# Patient Record
Sex: MALE | Race: WHITE | NOT HISPANIC OR LATINO | URBAN - METROPOLITAN AREA
[De-identification: names, ages, dates, MRNs, and addresses within clinical notes are randomized per-mention and may not be internally consistent; named-entity substitution may affect disease eponyms.]

---

## 2017-04-06 ENCOUNTER — TELEPHONE (OUTPATIENT)
Dept: FAMILY MEDICINE | Facility: CLINIC | Age: 32
End: 2017-04-06

## 2017-04-06 ENCOUNTER — OFFICE VISIT (OUTPATIENT)
Dept: FAMILY MEDICINE | Facility: CLINIC | Age: 32
End: 2017-04-06
Payer: COMMERCIAL

## 2017-04-06 VITALS
SYSTOLIC BLOOD PRESSURE: 110 MMHG | TEMPERATURE: 97.8 F | HEIGHT: 66 IN | DIASTOLIC BLOOD PRESSURE: 70 MMHG | WEIGHT: 148.5 LBS | BODY MASS INDEX: 23.87 KG/M2 | OXYGEN SATURATION: 97 % | HEART RATE: 103 BPM

## 2017-04-06 DIAGNOSIS — R14.2 FLATULENCE, ERUCTATION, AND GAS PAIN: ICD-10-CM

## 2017-04-06 DIAGNOSIS — R14.3 FLATULENCE, ERUCTATION, AND GAS PAIN: ICD-10-CM

## 2017-04-06 DIAGNOSIS — Z87.890 TRANS-SEXUALISM, STATUS POST GENDER REASSIGNMENT SURGERY: ICD-10-CM

## 2017-04-06 DIAGNOSIS — R10.84 ABDOMINAL PAIN, GENERALIZED: Primary | ICD-10-CM

## 2017-04-06 DIAGNOSIS — R14.1 FLATULENCE, ERUCTATION, AND GAS PAIN: ICD-10-CM

## 2017-04-06 LAB
BASOPHILS # BLD AUTO: 0 10E9/L (ref 0–0.2)
BASOPHILS NFR BLD AUTO: 0.2 %
DIFFERENTIAL METHOD BLD: NORMAL
EOSINOPHIL # BLD AUTO: 0.1 10E9/L (ref 0–0.7)
EOSINOPHIL NFR BLD AUTO: 1.1 %
ERYTHROCYTE [DISTWIDTH] IN BLOOD BY AUTOMATED COUNT: 12.2 % (ref 10–15)
HCT VFR BLD AUTO: 46.2 % (ref 40–53)
HGB BLD-MCNC: 15.6 G/DL (ref 13.3–17.7)
LYMPHOCYTES # BLD AUTO: 2 10E9/L (ref 0.8–5.3)
LYMPHOCYTES NFR BLD AUTO: 24.2 %
MCH RBC QN AUTO: 30.6 PG (ref 26.5–33)
MCHC RBC AUTO-ENTMCNC: 33.8 G/DL (ref 31.5–36.5)
MCV RBC AUTO: 91 FL (ref 78–100)
MONOCYTES # BLD AUTO: 0.6 10E9/L (ref 0–1.3)
MONOCYTES NFR BLD AUTO: 7.6 %
NEUTROPHILS # BLD AUTO: 5.4 10E9/L (ref 1.6–8.3)
NEUTROPHILS NFR BLD AUTO: 66.9 %
PLATELET # BLD AUTO: 376 10E9/L (ref 150–450)
RBC # BLD AUTO: 5.1 10E12/L (ref 4.4–5.9)
WBC # BLD AUTO: 8.1 10E9/L (ref 4–11)

## 2017-04-06 PROCEDURE — 99214 OFFICE O/P EST MOD 30 MIN: CPT | Performed by: PHYSICIAN ASSISTANT

## 2017-04-06 PROCEDURE — 85025 COMPLETE CBC W/AUTO DIFF WBC: CPT | Performed by: PHYSICIAN ASSISTANT

## 2017-04-06 PROCEDURE — 36415 COLL VENOUS BLD VENIPUNCTURE: CPT | Performed by: PHYSICIAN ASSISTANT

## 2017-04-06 PROCEDURE — 80053 COMPREHEN METABOLIC PANEL: CPT | Performed by: PHYSICIAN ASSISTANT

## 2017-04-06 RX ORDER — DULOXETIN HYDROCHLORIDE 60 MG/1
60 CAPSULE, DELAYED RELEASE ORAL DAILY
COMMUNITY
Start: 2017-02-09 | End: 2018-06-08 | Stop reason: ALTCHOICE

## 2017-04-06 RX ORDER — TESTOSTERONE 12.5 MG/1.25G
100 GEL TOPICAL DAILY
Qty: 150 G | Refills: 5 | Status: SHIPPED | OUTPATIENT
Start: 2017-04-06 | End: 2017-10-16

## 2017-04-06 NOTE — MR AVS SNAPSHOT
"              After Visit Summary   4/6/2017    Kevin Lackaff Gilligan    MRN: [id number]           Patient Information     Date Of Birth          1985        Visit Information        Provider Department      4/6/2017 4:40 PM Davis Awad PA-C [facility]        Today's Diagnoses     Abdominal pain, generalized    -  1    Flatulence, eructation, and gas pain        Trans-sexualism, status post gender reassignment surgery           Follow-ups after your visit        Who to contact     If you have questions or need follow up information about today's clinic visit or your schedule please contact [facility] directly at [phone].  Normal or non-critical lab and imaging results will be communicated to you by Monkimunhart, letter or phone within 4 business days after the clinic has received the results. If you do not hear from us within 7 days, please contact the clinic through Monkimunhart or phone. If you have a critical or abnormal lab result, we will notify you by phone as soon as possible.  Submit refill requests through Inktank or call your pharmacy and they will forward the refill request to us. Please allow 3 business days for your refill to be completed.          Additional Information About Your Visit        MyChart Information     Inktank gives you secure access to your electronic health record. If you see a primary care provider, you can also send messages to your care team and make appointments. If you have questions, please call your primary care clinic.  If you do not have a primary care provider, please call [phone] and they will assist you.        Care EveryWhere ID     This is your Care EveryWhere ID. This could be used by other organizations to access your [facility] medical records  [id number]        Your Vitals Were     Pulse Temperature Height Pulse Oximetry BMI (Body Mass Index)       103 97.8  F (36.6  C) (Tympanic) 5' 6\" (1.676 m) 97% 23.97 kg/m2        Blood " Pressure from Last 3 Encounters:   04/06/17 110/70   07/19/16 127/78   09/28/15 133/90    Weight from Last 3 Encounters:   04/06/17 148 lb 8 oz (67.4 kg)   07/19/16 146 lb (66.2 kg)   09/28/15 152 lb (68.9 kg)              We Performed the Following     CBC with platelets differential     Comprehensive metabolic panel          Today's Medication Changes          These changes are accurate as of: 4/6/17  5:09 PM.  If you have any questions, ask your nurse or doctor.               These medicines have changed or have updated prescriptions.        Dose/Directions    * TESTIM 50 MG/5GM (1%) topical gel   This may have changed:  Another medication with the same name was added. Make sure you understand how and when to take each.   Used for:  Trans-sexualism, status post gender reassignment surgery   Generic drug:  testosterone   Changed by:  Apurva Ruano MD        Uses compounded 10% gel 100mg/GM 2 pumps applied per day ([id number]) Apply to clean, dry, intact skin of the shoulders, upper arms, or abdomen.   Quantity:  3 Month   Refills:  3       * testosterone 50 MG/5GM (1%) topical gel   Commonly known as:  ANDROGEL/TESTIM   This may have changed:  Another medication with the same name was added. Make sure you understand how and when to take each.   Used for:  Trans-sexualism, status post gender reassignment surgery   Changed by:  Apurva Ruano MD        Dose:  100 mg   Place 2 packets (100 mg) onto the skin daily   Quantity:  3 Month   Refills:  3       * testosterone 50 MG/5GM (1%) topical gel   Commonly known as:  ANDROGEL   This may have changed:  You were already taking a medication with the same name, and this prescription was added. Make sure you understand how and when to take each.   Used for:  Trans-sexualism, status post gender reassignment surgery   Changed by:  Davis Awad PA-C        Dose:  100 mg   Place 2 packets (100 mg) onto the skin daily Apply to the clean, dry intact  skin of the shoulders, upper arms or abdomen.   Quantity:  150 g   Refills:  5       * Notice:  This list has 3 medication(s) that are the same as other medications prescribed for you. Read the directions carefully, and ask your doctor or other care provider to review them with you.         Where to get your medicines      Some of these will need a paper prescription and others can be bought over the counter.  Ask your nurse if you have questions.     Bring a paper prescription for each of these medications     testosterone 50 MG/5GM (1%) topical gel                Primary Care Provider Office Phone # Fax #    Apurva Ruano [phone]       [facility] [address] [address]        Thank you!     Thank you for choosing [facility]  for your care. Our goal is always to provide you with excellent care. Hearing back from our patients is one way we can continue to improve our services. Please take a few minutes to complete the written survey that you may receive in the mail after your visit with us. Thank you!             Your Updated Medication List - Protect others around you: Learn how to safely use, store and throw away your medicines at www.disposemymeds.org.          This list is accurate as of: 4/6/17  5:09 PM.  Always use your most recent med list.                   Brand Name Dispense Instructions for use    DULoxetine 60 MG EC capsule    CYMBALTA     Take 60 mg by mouth daily       propranolol 40 MG tablet    INDERAL    180 tablet    Take 1 tablet (40 mg) by mouth 2 times daily Use as needed for presentations       * TESTIM 50 MG/5GM (1%) topical gel   Generic drug:  testosterone     3 Month    Uses compounded 10% gel 100mg/GM 2 pumps applied per day ([id number]) Apply to clean, dry, intact skin of the shoulders, upper arms, or abdomen.       * testosterone 50 MG/5GM (1%) topical gel    ANDROGEL/TESTIM    3 Month    Place 2 packets (100 mg)  onto the skin daily       * testosterone 50 MG/5GM (1%) topical gel    ANDROGEL    150 g    Place 2 packets (100 mg) onto the skin daily Apply to the clean, dry intact skin of the shoulders, upper arms or abdomen.       * Notice:  This list has 3 medication(s) that are the same as other medications prescribed for you. Read the directions carefully, and ask your doctor or other care provider to review them with you.

## 2017-04-06 NOTE — TELEPHONE ENCOUNTER
"Patient is on the schedule for 440pm today for severe stomach pain.  Was asked to triage pt.    Pt states he's had lower abd pain for the past couple days.  Pain was sharp and throbbing last night.  Then had episode of diarrhea and felt better  Pain currently 7/10 and throbs  No longer a sharp pain  Pain is to whole lower abd, not on one particular side  Afebrile. No blood in stool. Nauseous sometimes.    Advised ER if pain \"severe\" because better imaging options, etc.  Pt states he didn't even want to come to the clinic; his parents made him schedule  Advised he keep appt here then for assessment and JS will determine next steps  Jessica SMAANIEGO RN    "

## 2017-04-06 NOTE — PROGRESS NOTES
SUBJECTIVE:                                                    Kevin Lackaff Gilligan is a 31 year old male who presents to clinic today for the following health issues:      Abdominal Pain      Duration: This week  Worse last night     Description (location/character/radiation): LQ pain       Associated flank pain:  Lower back    Intensity:  6/10    Accompanying signs and symptoms:        Fever/Chills: no        Gas/Bloating: YES-  Gas        Nausea/vomitting: YES- nausea more so just last night       Diarrhea: YES-  Just last night       Dysuria or Hematuria: no     History (previous similar pain/trauma/previous testing):   Unsure     Precipitating or alleviating factors:       Pain worse with eating/BM/urination: no       Pain relieved by BM: YES-  Last night    Therapies tried and outcome:  none    LMP:  not applicable   All.DREW Solo          Problem list and histories reviewed & adjusted, as indicated.  Additional history: 32 y/o trans sexual male here for evaluation of the above symptoms for the last week.  Just started with some general lower abdomen pain for the last week, thought it might have been some gas.  It was quite a bit worse last night, and he did have some diarrhea, which did help.  During the day today, still some pain.  Not been passing more gas per se. Unsure if he has eaten anything.  Not real hungry no vomit.    No fevers or chills.  No recent changes in medication.  No blood in stool.        He also does need refills of his testosterone.  I has almost been 1 year since his levels were last checked.  Taking 100 mg of testosterone.  Not taking anything aromatase inhibitors.      BP Readings from Last 3 Encounters:   04/06/17 110/70   07/19/16 127/78   09/28/15 133/90    Wt Readings from Last 3 Encounters:   04/06/17 148 lb 8 oz (67.4 kg)   07/19/16 146 lb (66.2 kg)   09/28/15 152 lb (68.9 kg)                    Reviewed and updated as needed this visit by clinical staff       Reviewed and  "updated as needed this visit by Provider         ROS:  Constitutional, HEENT, cardiovascular, pulmonary, gi and gu systems are negative, except as otherwise noted.    OBJECTIVE:                                                    /70 (BP Location: Right arm, Patient Position: Right side, Cuff Size: Adult Regular)  Pulse 103  Temp 97.8  F (36.6  C) (Tympanic)  Ht 5' 6\" (1.676 m)  Wt 148 lb 8 oz (67.4 kg)  SpO2 97%  BMI 23.97 kg/m2  Body mass index is 23.97 kg/(m^2).  GENERAL: alert and no distress  EYES: Eyes grossly normal to inspection  RESP: lungs clear to auscultation - no rales, rhonchi or wheezes  CV: regular rate and rhythm, normal S1 S2, no S3 or S4, no murmur, click or rub, no peripheral edema and peripheral pulses strong  ABDOMEN: normal bowel sounds.  No masses.  Generalized lower abdomen tenderness without rebound or guarding.      Diagnostic Test Results:  none      ASSESSMENT/PLAN:                                                            1. Abdominal pain, generalized  Does seem most consistent with gastroenteritis with cramping and diarrhea.  Will check some labs, but will not have until tomorrow.  I think some OTC pepto may be worth a trial.  Hopefully this will just run its course.  If gets much worse tonight, do not hesitate to go to ER for quicker evaluation.  Does not appear to be acute abdomen.  - CBC with platelets differential  - Comprehensive metabolic panel    2. Flatulence, eructation, and gas pain      3. Trans-sexualism, status post gender reassignment surgery  Does need to get levels rechecked, and would encourage him to add estrogen level to make sure he is not aromatizing his external testosterone.  Would like him to get back to baseline before checking those labs.  - testosterone (ANDROGEL) 50 MG/5GM (1%) topical gel; Place 2 packets (100 mg) onto the skin daily Apply to the clean, dry intact skin of the shoulders, upper arms or abdomen.  Dispense: 150 g; Refill: " 5        Davis Awad PA-C  [facility]

## 2017-04-06 NOTE — NURSING NOTE
"Chief Complaint   Patient presents with     Abdominal Pain     /70 (BP Location: Right arm, Patient Position: Right side, Cuff Size: Adult Regular)  Pulse 103  Temp 97.8  F (36.6  C) (Tympanic)  Ht 5' 6\" (1.676 m)  Wt 148 lb 8 oz (67.4 kg)  SpO2 97%  BMI 23.97 kg/m2 Estimated body mass index is 23.97 kg/(m^2) as calculated from the following:    Height as of this encounter: 5' 6\" (1.676 m).    Weight as of this encounter: 148 lb 8 oz (67.4 kg).  bp completed using cuff size: regular      Health Maintenance addressed:  NONE    n/a              "

## 2017-04-07 LAB
ALBUMIN SERPL-MCNC: 4.2 G/DL (ref 3.4–5)
ALP SERPL-CCNC: 91 U/L (ref 40–150)
ALT SERPL W P-5'-P-CCNC: 27 U/L (ref 0–70)
ANION GAP SERPL CALCULATED.3IONS-SCNC: 7 MMOL/L (ref 3–14)
AST SERPL W P-5'-P-CCNC: 20 U/L (ref 0–45)
BILIRUB SERPL-MCNC: 0.8 MG/DL (ref 0.2–1.3)
BUN SERPL-MCNC: 7 MG/DL (ref 7–30)
CALCIUM SERPL-MCNC: 9.2 MG/DL (ref 8.5–10.1)
CHLORIDE SERPL-SCNC: 107 MMOL/L (ref 94–109)
CO2 SERPL-SCNC: 27 MMOL/L (ref 20–32)
CREAT SERPL-MCNC: 0.89 MG/DL (ref 0.66–1.25)
GFR SERPL CREATININE-BSD FRML MDRD: NORMAL ML/MIN/1.7M2
GLUCOSE SERPL-MCNC: 70 MG/DL (ref 70–99)
POTASSIUM SERPL-SCNC: 3.8 MMOL/L (ref 3.4–5.3)
PROT SERPL-MCNC: 8 G/DL (ref 6.8–8.8)
SODIUM SERPL-SCNC: 141 MMOL/L (ref 133–144)

## 2017-04-10 NOTE — PROGRESS NOTES
Dear Lele    Your test results are attached, feel free to contact me via Jumiot    Everything looks good on recent labs.  Let me know if symptoms persist or worsen.    Gene Awad PA-C

## 2017-04-12 ENCOUNTER — TELEPHONE (OUTPATIENT)
Dept: FAMILY MEDICINE | Facility: CLINIC | Age: 32
End: 2017-04-12

## 2017-04-12 NOTE — TELEPHONE ENCOUNTER
Initiated prior auth by making phone call to [employer] at phone number [phone] for Testosterone 1% gel.  ID# [id number]  PA approved over the phone for 12 months.  [facility] [address] ph# [phone]    Status:  Approved

## 2017-04-28 DIAGNOSIS — Z87.890 TRANS-SEXUALISM, STATUS POST GENDER REASSIGNMENT SURGERY: ICD-10-CM

## 2017-04-28 LAB
ALBUMIN SERPL-MCNC: 4 G/DL (ref 3.4–5)
ALP SERPL-CCNC: 104 U/L (ref 40–150)
ALT SERPL W P-5'-P-CCNC: 25 U/L (ref 0–70)
ANION GAP SERPL CALCULATED.3IONS-SCNC: 8 MMOL/L (ref 3–14)
AST SERPL W P-5'-P-CCNC: 18 U/L (ref 0–45)
BASOPHILS # BLD AUTO: 0 10E9/L (ref 0–0.2)
BASOPHILS NFR BLD AUTO: 0.3 %
BILIRUB SERPL-MCNC: 0.5 MG/DL (ref 0.2–1.3)
BUN SERPL-MCNC: 9 MG/DL (ref 7–30)
CALCIUM SERPL-MCNC: 9.5 MG/DL (ref 8.5–10.1)
CHLORIDE SERPL-SCNC: 105 MMOL/L (ref 94–109)
CHOLEST SERPL-MCNC: 152 MG/DL
CO2 SERPL-SCNC: 27 MMOL/L (ref 20–32)
CREAT SERPL-MCNC: 0.98 MG/DL (ref 0.66–1.25)
DIFFERENTIAL METHOD BLD: NORMAL
EOSINOPHIL # BLD AUTO: 0.2 10E9/L (ref 0–0.7)
EOSINOPHIL NFR BLD AUTO: 2.5 %
ERYTHROCYTE [DISTWIDTH] IN BLOOD BY AUTOMATED COUNT: 12 % (ref 10–15)
ESTRADIOL SERPL-MCNC: 31 PG/ML (ref 6–50)
GFR SERPL CREATININE-BSD FRML MDRD: 89 ML/MIN/1.7M2
GLUCOSE SERPL-MCNC: 87 MG/DL (ref 70–99)
HCT VFR BLD AUTO: 46.3 % (ref 40–53)
HDLC SERPL-MCNC: 51 MG/DL
HGB BLD-MCNC: 15.5 G/DL (ref 13.3–17.7)
LDLC SERPL CALC-MCNC: 91 MG/DL
LYMPHOCYTES # BLD AUTO: 1.7 10E9/L (ref 0.8–5.3)
LYMPHOCYTES NFR BLD AUTO: 27.4 %
MCH RBC QN AUTO: 30 PG (ref 26.5–33)
MCHC RBC AUTO-ENTMCNC: 33.5 G/DL (ref 31.5–36.5)
MCV RBC AUTO: 90 FL (ref 78–100)
MONOCYTES # BLD AUTO: 0.5 10E9/L (ref 0–1.3)
MONOCYTES NFR BLD AUTO: 8 %
NEUTROPHILS # BLD AUTO: 3.7 10E9/L (ref 1.6–8.3)
NEUTROPHILS NFR BLD AUTO: 61.8 %
NONHDLC SERPL-MCNC: 101 MG/DL
PLATELET # BLD AUTO: 379 10E9/L (ref 150–450)
POTASSIUM SERPL-SCNC: 4.5 MMOL/L (ref 3.4–5.3)
PROT SERPL-MCNC: 7.9 G/DL (ref 6.8–8.8)
RBC # BLD AUTO: 5.16 10E12/L (ref 4.4–5.9)
SODIUM SERPL-SCNC: 140 MMOL/L (ref 133–144)
TRIGL SERPL-MCNC: 50 MG/DL
WBC # BLD AUTO: 6 10E9/L (ref 4–11)

## 2017-04-28 PROCEDURE — 36415 COLL VENOUS BLD VENIPUNCTURE: CPT | Performed by: PHYSICIAN ASSISTANT

## 2017-04-28 PROCEDURE — 82670 ASSAY OF TOTAL ESTRADIOL: CPT | Performed by: PHYSICIAN ASSISTANT

## 2017-04-28 PROCEDURE — 80061 LIPID PANEL: CPT | Performed by: PHYSICIAN ASSISTANT

## 2017-04-28 PROCEDURE — 85025 COMPLETE CBC W/AUTO DIFF WBC: CPT | Performed by: PHYSICIAN ASSISTANT

## 2017-04-28 PROCEDURE — 84403 ASSAY OF TOTAL TESTOSTERONE: CPT | Performed by: PHYSICIAN ASSISTANT

## 2017-04-28 PROCEDURE — 80053 COMPREHEN METABOLIC PANEL: CPT | Performed by: PHYSICIAN ASSISTANT

## 2017-05-01 LAB — TESTOST SERPL-MCNC: 595 NG/DL (ref 240–950)

## 2017-10-16 DIAGNOSIS — Z87.890 TRANS-SEXUALISM, STATUS POST GENDER REASSIGNMENT SURGERY: ICD-10-CM

## 2017-10-16 NOTE — TELEPHONE ENCOUNTER
SN  Refill request for Testosterone   Level = 595 (4/28/17).  Last OV 4/28/17.  Routing refill request to provider for review/approval because:  Drug not on the [facility] refill protocol   Please authorize if appropriate.  Thanks, Ana Delaney RN

## 2017-10-18 RX ORDER — TESTOSTERONE 12.5 MG/1.25G
100 GEL TOPICAL DAILY
Qty: 150 G | Refills: 5 | Status: SHIPPED | OUTPATIENT
Start: 2017-10-18 | End: 2018-06-08

## 2017-10-18 NOTE — TELEPHONE ENCOUNTER
Rx Faxed [address]  Amanda [facility] Unit Coordinator

## 2017-11-27 ENCOUNTER — TELEPHONE (OUTPATIENT)
Dept: FAMILY MEDICINE | Facility: CLINIC | Age: 32
End: 2017-11-27

## 2017-11-27 DIAGNOSIS — F43.22 ADJUSTMENT DISORDER WITH ANXIETY: Primary | ICD-10-CM

## 2017-11-27 NOTE — TELEPHONE ENCOUNTER
SN,  Please see below referral request  Gabbi here at [place] does CBT - want to refer to her?  Jessica SAMANIEGO RN

## 2017-11-27 NOTE — TELEPHONE ENCOUNTER
Reason for Call: call back    Detailed comments: Pts mother calling in requesting referral for cognitive behavorial therapy for son. She states that he has severe anxiety and has been on many different medications over the years and pt is now wanting to try this method of therapy. Please call either mother or pt to advise.     Phone Number Patient can be reached at: Cell number on file:    Telephone Information:   Mobile [phone]     [facility] [phone] (H) mother    Best Time: any    Can we leave a detailed message on this number? YES    Call taken on 11/27/2017 at 8:51 AM by Maureen You

## 2017-11-27 NOTE — TELEPHONE ENCOUNTER
Gave mom Yodit (on DAVID) referral information  She will have pt call to schedule appt   Jessica SAMANIEGO RN

## 2018-01-02 ENCOUNTER — OFFICE VISIT (OUTPATIENT)
Dept: BEHAVIORAL HEALTH | Facility: CLINIC | Age: 33
End: 2018-01-02
Attending: FAMILY MEDICINE
Payer: COMMERCIAL

## 2018-01-02 DIAGNOSIS — F41.1 GAD (GENERALIZED ANXIETY DISORDER): Primary | ICD-10-CM

## 2018-01-02 DIAGNOSIS — F33.1 MAJOR DEPRESSIVE DISORDER, RECURRENT EPISODE, MODERATE (H): ICD-10-CM

## 2018-01-02 PROCEDURE — 90791 PSYCH DIAGNOSTIC EVALUATION: CPT | Performed by: COUNSELOR

## 2018-01-02 ASSESSMENT — PATIENT HEALTH QUESTIONNAIRE - PHQ9
5. POOR APPETITE OR OVEREATING: MORE THAN HALF THE DAYS
SUM OF ALL RESPONSES TO PHQ QUESTIONS 1-9: 12

## 2018-01-02 ASSESSMENT — ANXIETY QUESTIONNAIRES
7. FEELING AFRAID AS IF SOMETHING AWFUL MIGHT HAPPEN: MORE THAN HALF THE DAYS
GAD7 TOTAL SCORE: 11
1. FEELING NERVOUS, ANXIOUS, OR ON EDGE: MORE THAN HALF THE DAYS
2. NOT BEING ABLE TO STOP OR CONTROL WORRYING: MORE THAN HALF THE DAYS
5. BEING SO RESTLESS THAT IT IS HARD TO SIT STILL: SEVERAL DAYS
3. WORRYING TOO MUCH ABOUT DIFFERENT THINGS: MORE THAN HALF THE DAYS
6. BECOMING EASILY ANNOYED OR IRRITABLE: NOT AT ALL
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
## 2018-01-02 NOTE — Clinical Note
Hi Dr. Ruano,  I saw your patient Lele for mental health counseling intake today. He has a diagnosis of Generalized Anxiety Disorder and Major Depressive Disorder, recurrent, moderate. He also has several symptoms of PTSD, though I need more information before diagnosing this. He told me he is already seeing a therapist, so I'm going to connect with her and may or may not see him on an ongoing basis, depending on what she says.  Thank you for the referral! Please let me know if you have any questions/concerns.  Gabbi

## 2018-01-03 ASSESSMENT — ANXIETY QUESTIONNAIRES: GAD7 TOTAL SCORE: 11

## 2018-01-09 ENCOUNTER — OFFICE VISIT (OUTPATIENT)
Dept: BEHAVIORAL HEALTH | Facility: CLINIC | Age: 33
End: 2018-01-09
Attending: FAMILY MEDICINE
Payer: COMMERCIAL

## 2018-01-09 DIAGNOSIS — F33.1 MAJOR DEPRESSIVE DISORDER, RECURRENT EPISODE, MODERATE (H): ICD-10-CM

## 2018-01-09 DIAGNOSIS — F41.1 GAD (GENERALIZED ANXIETY DISORDER): Primary | ICD-10-CM

## 2018-01-09 PROCEDURE — 90834 PSYTX W PT 45 MINUTES: CPT | Performed by: COUNSELOR

## 2018-01-18 NOTE — PROGRESS NOTES
"                                             Progress Note    Client Name: Kevin Lackaff Gilligan  Date: 1/9/18         Service Type: Individual      Session Start Time: 9:30am  Session End Time: 10:20am      Session Length: 50 min     Session #: 2     Attendees: Client attended alone    Treatment Plan Last Reviewed: preliminary at intake  PHQ-9 / ALEA-7 : see updates in EPIC     DATA      Progress Since Last Session (Related to Symptoms / Goals / Homework):   Symptoms: Stable    Homework: NA      Episode of Care Goals: No improvement - PREPARATION (Decided to change - considering how); Intervened by negotiating a change plan and determining options / strategies for behavior change, identifying triggers, exploring social supports, and working towards setting a date to begin behavior change     Current / Ongoing Stressors and Concerns:   Client reported he is still actively seeing another therapist. Said she received this writer's message and will call soon. Reported he notices a pattern of feeling afraid that others will be upset or disappointed with him. Discussed that he sometimes compares himself to his peers, which is distressing. Said he is logically able to think about how his circumstances have significantly differed from theirs, so there is no comparison, but this is difficult for him to take in and feel. Agreed to start keeping a mood/thought log.     Treatment Objective(s) Addressed in This Session:   Cognitive strategies to address anxious thoughts.     Intervention:   Gave handout on cognitive distortions.  Assigned homework to start keeping a mood/thought log.  Discussed core belief of \"I'm not good enough.\"  Validated concerns/feelings.        ASSESSMENT: Current Emotional / Mental Status (status of significant symptoms):   Risk status (Self / Other harm or suicidal ideation)   Client denies current fears or concerns for personal safety.   Client denies current or recent suicidal ideation or " behaviors.   Client denies current or recent homicidal ideation or behaviors.   Client denies current or recent self injurious behavior or ideation.   Client denies other safety concerns.   A safety and risk management plan has not been developed at this time, however client was given the after-hours number / 911 should there be a change in any of these risk factors.     Appearance:   Appropriate    Eye Contact:   Good    Psychomotor Behavior: Normal    Attitude:   Cooperative    Orientation:   All   Speech    Rate / Production: Normal     Volume:  Normal    Mood:    Anxious  Depressed    Affect:    Appropriate    Thought Content:  Rumination    Thought Form:  Coherent  Logical    Insight:    Good      Medication Review:   No changes to current psychiatric medication(s)     Medication Compliance:   Yes     Changes in Health Issues:   None reported     Chemical Use Review:   Substance Use: Chemical use reviewed, no active concerns identified      Tobacco Use: No current tobacco use.       Collateral Reports Completed:   Not Applicable    PLAN: (Client Tasks / Therapist Tasks / Other)  Continue to meet with client approximately every-other week. Follow up about homework. Continue discussion of core beliefs. Continue to monitor mood and thought process.        Carly Gershone, LPCC                                                         _

## 2018-01-25 ENCOUNTER — OFFICE VISIT (OUTPATIENT)
Dept: BEHAVIORAL HEALTH | Facility: CLINIC | Age: 33
End: 2018-01-25
Payer: COMMERCIAL

## 2018-01-25 DIAGNOSIS — F33.1 MAJOR DEPRESSIVE DISORDER, RECURRENT EPISODE, MODERATE (H): ICD-10-CM

## 2018-01-25 DIAGNOSIS — F41.1 GAD (GENERALIZED ANXIETY DISORDER): Primary | ICD-10-CM

## 2018-01-25 PROCEDURE — 90834 PSYTX W PT 45 MINUTES: CPT | Performed by: COUNSELOR

## 2018-01-25 NOTE — PROGRESS NOTES
"                                             Progress Note    Client Name: Kevin Lackaff Gilligan  Date: 1/25/18         Service Type: Individual      Session Start Time: 11am  Session End Time: 11:50am      Session Length: 50 min     Session #: 3     Attendees: Client attended alone    Treatment Plan Last Reviewed: 1/25/18  PHQ-9 / ALEA-7 : see updates in EPIC     DATA      Progress Since Last Session (Related to Symptoms / Goals / Homework):   Symptoms: Stable    Homework: NA      Episode of Care Goals: Minimal progress - PREPARATION (Decided to change - considering how); Intervened by negotiating a change plan and determining options / strategies for behavior change, identifying triggers, exploring social supports, and working towards setting a date to begin behavior change     Current / Ongoing Stressors and Concerns:   Client participated in treatment planning. Client reported he has been keeping track of times when he is feeling higher anxiety. Discussed a couple of these incidents that occurred since last session. Discussed a theme of feeling afraid of what others think of him, and feeling afraid that he might \"fail\" in front of others. Acknowledged that this is usually worse in his imagination than it is in reality. Discussed a networking meeting he had with his brother's friend. Said the friend made some disparaging comments about him at this meeting. 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     Treatment Objective(s) Addressed in This Session:   Client will use cognitive strategies identified in therapy to challenge anxious thoughts.     Intervention:   Reinforced keeping mood/thought log. Did thought record exercise with client around some of the thoughts he reported since last session. Discussed evidence for/against. Encouraged focusing on shifting to \"alternative thought\".  Validated concerns/feelings.        ASSESSMENT: Current Emotional / Mental Status (status of significant " symptoms):   Risk status (Self / Other harm or suicidal ideation)   Client denies current fears or concerns for personal safety.   Client denies current or recent suicidal ideation or behaviors.   Client denies current or recent homicidal ideation or behaviors.   Client denies current or recent self injurious behavior or ideation.   Client denies other safety concerns.   A safety and risk management plan has not been developed at this time, however client was given the after-hours number / 911 should there be a change in any of these risk factors.     Appearance:   Appropriate    Eye Contact:   Good    Psychomotor Behavior: Normal    Attitude:   Cooperative    Orientation:   All   Speech    Rate / Production: Normal     Volume:  Normal    Mood:    Anxious  Depressed    Affect:    Appropriate    Thought Content:  Rumination    Thought Form:  Coherent  Logical    Insight:    Good      Medication Review:   No changes to current psychiatric medication(s)     Medication Compliance:   Yes     Changes in Health Issues:   None reported     Chemical Use Review:   Substance Use: Chemical use reviewed, no active concerns identified      Tobacco Use: No current tobacco use.       Collateral Reports Completed:   Not Applicable    PLAN: (Client Tasks / Therapist Tasks / Other)  Continue to meet with client approximately every-other week. Follow up about homework. Continue discussion of core beliefs. Continue to monitor mood and thought process.        Carly Gershone, [facility]                                                           ________________________________________________________________________    Treatment Plan    Client's Name: Kevin Lackaff Gilligan  YOB: 1985    Date: 1/25/18    DSM-V Diagnoses:            296.32 (F33.1) Major Depressive Disorder, Recurrent Episode, Moderate _  300.02 (F41.1) Generalized Anxiety Disorder  R/O 309.81 (F43.10) Posttraumatic Stress Disorder With dissociative  symptoms  Psychosocial & Contextual Factors: Identifies as trans*; history of experiencing significant bullying as a child/adolescent  WHODAS 2.0 (12 item)                          This questionnaire asks about difficulties due to health conditions. Health conditions                   include                        disease or illnesses, other health problems that may be short or long lasting,                    injuries, mental health or emotional problems, and problems with alcohol or drugs.                              Think back over the past 30 days and answer these questions, thinking about how much              difficulty you had doing the following activities. For each question, please [place] only                   one response.     S1 Standing for long periods such as 30 minutes? None =         1   S2 Taking care of household responsibilities? None =         1   S3 Learning a new task, for example, learning how to get to a new place? Moderate =   3   S4 How much of a problem do you have joining community activities (for example, festivals, Alevism or other activities) in the same way as anyone else can? Moderate =   3   S5 How much have you been emotionally affected by your health problems? Moderate =   3           In the past 30 days, how much difficulty did you have in:   S6 Concentrating on doing something for ten minutes? Moderate =   3   S7 Walking a long distance such as a kilometer (or equivalent)? None =         1   S8 Washing your whole body? None =         1   S9 Getting dressed? None =         1   S10 Dealing with people you do not know? Mild =           2   S11 Maintaining a friendship? None =         1   S12 Your day to day work? Did not respond to this item      H1 Overall, in the past 30 days, how many days were these difficulties present? Record number of days 30   H2 In the past 30 days, for how many days were you totally unable to carry out your usual activities or work because of any  "health condition? Record number of days  30   H3 In the past 30 days, not counting the days that you were totally unable, for how many days did you cut back or reduce your usual activities or work because of any health condition? Record number of days 30         Referral / Collaboration:  Referral to another professional/service is not indicated at this time..    Anticipated number of session or this episode of care: 6-12 months      MeasurableTreatment Goal(s) related to diagnosis / functional impairment(s)  Goal 1: Client will experience a reduction in symptoms of anxiety. \"Not getting as thrown off.\"    I will know I've met my goal when I can be in situations and not jump to an immediate conclusion that everything is lost, terrible When it's not all-or-nothing.       Objective #A (Client Action)    Status: New - Date: 1/25/18    Client will problem-solve stressors effectively.    Intervention(s)  Therapist will teach problem-solving strategies.   Provide space for client to problem-solve and process in session.      Objective #B  Client will use at least 5 coping skills for anxiety management in the next 5 weeks.               Status: New - Date: 1/25/18      Intervention(s)  Therapist will teach coping strategies, including relaxation strategies.  Assign homework to use/practice outside of sessions.    Objective #C  Client will use cognitive strategies identified in therapy to challenge anxious thoughts.  Status: New - Date: 1/25/18     Intervention(s)  Therapist will teach CBT concepts/strategies (cognitive distortions, thought record).  Assign homework to use/practice outside of sessions.        Goal 2: Client will experience a reduction in symptoms of depression.    I will know I've met my goal when I feel like the anxiety is more under control, which is linked to the depression symptoms. Feeling more confident.      Objective #A (Client Action)    Status: New - Date: 1/25/18     Client will Improve quantity " and quality of night time sleep / decrease daytime naps  Identify negative self-talk and behaviors: challenge core beliefs, myths, and actions  Improve concentration, focus, and mindfulness in daily activities .    Intervention(s)  Therapist will teach CBT and DBT techniques/strategies.  Teach sleep hygiene.  Assign homework to use/practice outside of sessions.      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    I will know I've met my goal when I'm able to get the real estate license, and feel confident about the process even when it's hard.      Objective #A (Client Action)    Status: New - Date: 1/25/18     Client will work on breaking tasks/goals down into smaller parts.    Intervention(s)  Therapist will teach strategies around productivity and task completion.  Assign homework to use/practice outside of sessions.      Client has reviewed and agreed to the above plan.      Carly Gershone, [facility]  January 25, 2018

## 2018-02-08 ENCOUNTER — OFFICE VISIT (OUTPATIENT)
Dept: BEHAVIORAL HEALTH | Facility: CLINIC | Age: 33
End: 2018-02-08
Payer: COMMERCIAL

## 2018-02-08 DIAGNOSIS — F33.1 MAJOR DEPRESSIVE DISORDER, RECURRENT EPISODE, MODERATE (H): ICD-10-CM

## 2018-02-08 DIAGNOSIS — F41.1 GAD (GENERALIZED ANXIETY DISORDER): Primary | ICD-10-CM

## 2018-02-08 PROCEDURE — 90834 PSYTX W PT 45 MINUTES: CPT | Performed by: COUNSELOR

## 2018-02-08 ASSESSMENT — ANXIETY QUESTIONNAIRES
GAD7 TOTAL SCORE: 8
3. WORRYING TOO MUCH ABOUT DIFFERENT THINGS: MORE THAN HALF THE DAYS
5. BEING SO RESTLESS THAT IT IS HARD TO SIT STILL: NOT AT ALL
2. NOT BEING ABLE TO STOP OR CONTROL WORRYING: SEVERAL DAYS
6. BECOMING EASILY ANNOYED OR IRRITABLE: SEVERAL DAYS
7. FEELING AFRAID AS IF SOMETHING AWFUL MIGHT HAPPEN: SEVERAL DAYS
1. FEELING NERVOUS, ANXIOUS, OR ON EDGE: MORE THAN HALF THE DAYS

## 2018-02-08 ASSESSMENT — PATIENT HEALTH QUESTIONNAIRE - PHQ9: 5. POOR APPETITE OR OVEREATING: SEVERAL DAYS

## 2018-02-09 ASSESSMENT — PATIENT HEALTH QUESTIONNAIRE - PHQ9: SUM OF ALL RESPONSES TO PHQ QUESTIONS 1-9: 10

## 2018-02-09 ASSESSMENT — ANXIETY QUESTIONNAIRES: GAD7 TOTAL SCORE: 8

## 2018-03-02 ENCOUNTER — OFFICE VISIT (OUTPATIENT)
Dept: BEHAVIORAL HEALTH | Facility: CLINIC | Age: 33
End: 2018-03-02
Payer: COMMERCIAL

## 2018-03-02 DIAGNOSIS — F33.1 MAJOR DEPRESSIVE DISORDER, RECURRENT EPISODE, MODERATE (H): ICD-10-CM

## 2018-03-02 DIAGNOSIS — F41.1 GAD (GENERALIZED ANXIETY DISORDER): Primary | ICD-10-CM

## 2018-03-02 PROCEDURE — 90834 PSYTX W PT 45 MINUTES: CPT | Performed by: COUNSELOR

## 2018-03-07 NOTE — PROGRESS NOTES
Progress Note    Client Name: Kevin Lackaff Gilligan  Date: 3/2/18         Service Type: Individual      Session Start Time: 9am  Session End Time: 9:50am      Session Length: 50 min     Session #: 5     Attendees: Client attended alone    Treatment Plan Last Reviewed: 1/25/18  PHQ-9 / ALEA-7 : see updates in EPIC     DATA      Progress Since Last Session (Related to Symptoms / Goals / Homework):   Symptoms: Stable    Homework: NA      Episode of Care Goals: Minimal progress - ACTION     Current / Ongoing Stressors and Concerns:   Client discussed ongoing feelings of insecurity and comparing himself to others. Said he would like to date, but is not sure about this. Reported he continues to not discuss that he is trans with any of his friends. Expressed a wish to not care as much what others think.     Treatment Objective(s) Addressed in This Session:   Client will use cognitive strategies identified in therapy to challenge anxious thoughts.  Identify negative self-talk and behaviors: challenge core beliefs, myths, and actions     Intervention:   Encouraged ongoing keeping mood/thought log  Validated that client has been through difficult experiences in his life, and that it is difficult to tell others about his experience.  Validated experience/concerns/feelings.        ASSESSMENT: Current Emotional / Mental Status (status of significant symptoms):   Risk status (Self / Other harm or suicidal ideation)   Client denies current fears or concerns for personal safety.   Client denies current or recent suicidal ideation or behaviors.   Client denies current or recent homicidal ideation or behaviors.   Client denies current or recent self injurious behavior or ideation.   Client denies other safety concerns.   A safety and risk management plan has not been developed at this time, however client was given the after-hours number / 911 should there be a change in any of these  risk factors.     Appearance:   Appropriate    Eye Contact:   Good    Psychomotor Behavior: Normal    Attitude:   Cooperative    Orientation:   All   Speech    Rate / Production: Normal     Volume:  Normal    Mood:    Anxious  Depressed    Affect:    Appropriate    Thought Content:  Rumination    Thought Form:  Coherent  Logical    Insight:    Good      Medication Review:   No changes to current psychiatric medication(s)     Medication Compliance:   Yes     Changes in Health Issues:   None reported     Chemical Use Review:   Substance Use: Chemical use reviewed, no active concerns identified      Tobacco Use: No current tobacco use.       Collateral Reports Completed:   Not Applicable    PLAN: (Client Tasks / Therapist Tasks / Other)  Continue to meet with client approximately every-other week. Follow up about homework. Continue discussion of core beliefs. Continue to monitor mood and thought process.        Carly Gershone, [facility]                                                           ________________________________________________________________________    Treatment Plan    Client's Name: Kevin Lackaff Gilligan  YOB: 1985    Date: 1/25/18    DSM-V Diagnoses:            296.32 (F33.1) Major Depressive Disorder, Recurrent Episode, Moderate _  300.02 (F41.1) Generalized Anxiety Disorder  R/O 309.81 (F43.10) Posttraumatic Stress Disorder With dissociative symptoms  Psychosocial & Contextual Factors: Identifies as trans*; history of experiencing significant bullying as a child/adolescent  WHODAS 2.0 (12 item)                          This questionnaire asks about difficulties due to health conditions. Health conditions                   include                        disease or illnesses, other health problems that may be short or long lasting,                    injuries, mental health or emotional problems, and problems with alcohol or drugs.                              Think back over the past 30  days and answer these questions, thinking about how much              difficulty you had doing the following activities. For each question, please [place] only                   one response.     S1 Standing for long periods such as 30 minutes? None =         1   S2 Taking care of household responsibilities? None =         1   S3 Learning a new task, for example, learning how to get to a new place? Moderate =   3   S4 How much of a problem do you have joining community activities (for example, festivals, Sikh or other activities) in the same way as anyone else can? Moderate =   3   S5 How much have you been emotionally affected by your health problems? Moderate =   3           In the past 30 days, how much difficulty did you have in:   S6 Concentrating on doing something for ten minutes? Moderate =   3   S7 Walking a long distance such as a kilometer (or equivalent)? None =         1   S8 Washing your whole body? None =         1   S9 Getting dressed? None =         1   S10 Dealing with people you do not know? Mild =           2   S11 Maintaining a friendship? None =         1   S12 Your day to day work? Did not respond to this item      H1 Overall, in the past 30 days, how many days were these difficulties present? Record number of days 30   H2 In the past 30 days, for how many days were you totally unable to carry out your usual activities or work because of any health condition? Record number of days  30   H3 In the past 30 days, not counting the days that you were totally unable, for how many days did you cut back or reduce your usual activities or work because of any health condition? Record number of days 30         Referral / Collaboration:  Referral to another professional/service is not indicated at this time..    Anticipated number of session or this episode of care: 6-12 months      MeasurableTreatment Goal(s) related to diagnosis / functional impairment(s)  Goal 1: Client will experience a reduction  "in symptoms of anxiety. \"Not getting as thrown off.\"    I will know I've met my goal when I can be in situations and not jump to an immediate conclusion that everything is lost, terrible When it's not all-or-nothing.       Objective #A (Client Action)    Status: New - Date: 1/25/18    Client will problem-solve stressors effectively.    Intervention(s)  Therapist will teach problem-solving strategies.   Provide space for client to problem-solve and process in session.      Objective #B  Client will use at least 5 coping skills for anxiety management in the next 5 weeks.               Status: New - Date: 1/25/18      Intervention(s)  Therapist will teach coping strategies, including relaxation strategies.  Assign homework to use/practice outside of sessions.    Objective #C  Client will use cognitive strategies identified in therapy to challenge anxious thoughts.  Status: New - Date: 1/25/18     Intervention(s)  Therapist will teach CBT concepts/strategies (cognitive distortions, thought record).  Assign homework to use/practice outside of sessions.        Goal 2: Client will experience a reduction in symptoms of depression.    I will know I've met my goal when I feel like the anxiety is more under control, which is linked to the depression symptoms. Feeling more confident.      Objective #A (Client Action)    Status: New - Date: 1/25/18     Client will Improve quantity and quality of night time sleep / decrease daytime naps  Identify negative self-talk and behaviors: challenge core beliefs, myths, and actions  Improve concentration, focus, and mindfulness in daily activities .    Intervention(s)  Therapist will teach CBT and DBT techniques/strategies.  Teach sleep hygiene.  Assign homework to use/practice outside of sessions.      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    I will know I've met my goal when I'm able to get the real estate license, and feel " confident about the process even when it's hard.      Objective #A (Client Action)    Status: New - Date: 1/25/18     Client will work on breaking tasks/goals down into smaller parts.    Intervention(s)  Therapist will teach strategies around productivity and task completion.  Assign homework to use/practice outside of sessions.      Client has reviewed and agreed to the above plan.      Carly Gershone, [facility]  January 25, 2018

## 2018-03-21 ENCOUNTER — OFFICE VISIT (OUTPATIENT)
Dept: BEHAVIORAL HEALTH | Facility: CLINIC | Age: 33
End: 2018-03-21
Payer: COMMERCIAL

## 2018-03-21 DIAGNOSIS — F33.1 MAJOR DEPRESSIVE DISORDER, RECURRENT EPISODE, MODERATE (H): ICD-10-CM

## 2018-03-21 DIAGNOSIS — F41.1 GAD (GENERALIZED ANXIETY DISORDER): Primary | ICD-10-CM

## 2018-03-21 PROCEDURE — 90834 PSYTX W PT 45 MINUTES: CPT | Performed by: COUNSELOR

## 2018-03-21 RX ORDER — CITALOPRAM HYDROBROMIDE 20 MG/1
20 TABLET ORAL DAILY
Refills: 2 | COMMUNITY
Start: 2018-03-08 | End: 2024-09-16

## 2018-03-21 RX ORDER — LORAZEPAM 1 MG/1
1 TABLET ORAL DAILY PRN
COMMUNITY
Start: 2014-09-19 | End: 2024-09-16

## 2018-03-29 NOTE — PROGRESS NOTES
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 Discussed that he has a tendency to think he will fail, but usually does well on tests, etc. Reported he continues to feel insecurities and compare his life to others. Reported he has been doing thought record exercise which has been helpful.     Treatment Objective(s) Addressed in This Session:   Client will use cognitive strategies identified in therapy to challenge anxious thoughts.  Identify negative self-talk and behaviors: challenge core beliefs, myths, and actions     Intervention:   Encouraged ongoing keeping mood/thought log  Reinforced using thought record  Validated experience/concerns/feelings.        ASSESSMENT: Current Emotional / Mental Status (status of significant symptoms):   Risk status (Self / Other harm or suicidal ideation)   Client denies current fears or concerns for personal safety.   Client denies current or recent suicidal ideation or behaviors.   Client denies current or recent homicidal ideation or behaviors.   Client denies current or recent self injurious behavior or ideation.   Client denies other safety concerns.   A safety and risk management plan has not been developed at this time, however client was given the after-hours number / 911 should there be a change in any of these risk factors.     Appearance:   Appropriate     Eye Contact:   Good    Psychomotor Behavior: Normal    Attitude:   Cooperative    Orientation:   All   Speech    Rate / Production: Normal     Volume:  Normal    Mood:    Anxious  Depressed    Affect:    Appropriate    Thought Content:  Rumination    Thought Form:  Coherent  Logical    Insight:    Good      Medication Review:   No changes to current psychiatric medication(s)     Medication Compliance:   Yes     Changes in Health Issues:   None reported     Chemical Use Review:   Substance Use: Chemical use reviewed, no active concerns identified      Tobacco Use: No current tobacco use.       Collateral Reports Completed:   Not Applicable    PLAN: (Client Tasks / Therapist Tasks / Other)  Continue to meet with client approximately every-other week. Follow up about homework. Continue discussion of core beliefs. Continue to monitor mood and thought process.        Carly Gershone, [facility]                                                           ________________________________________________________________________    Treatment Plan    Client's Name: Kevin Lackaff Gilligan  YOB: 1985    Date: 1/25/18    DSM-V Diagnoses:            296.32 (F33.1) Major Depressive Disorder, Recurrent Episode, Moderate _  300.02 (F41.1) Generalized Anxiety Disorder  R/O 309.81 (F43.10) Posttraumatic Stress Disorder With dissociative symptoms  Psychosocial & Contextual Factors: Identifies as trans*; history of experiencing significant bullying as a child/adolescent  WHODAS 2.0 (12 item)                          This questionnaire asks about difficulties due to health conditions. Health conditions                   include                        disease or illnesses, other health problems that may be short or long lasting,                    injuries, mental health or emotional problems, and problems with alcohol or drugs.                              Think back over the past 30 days and answer these questions, thinking  "about how much              difficulty you had doing the following activities. For each question, please [place] only                   one response.     S1 Standing for long periods such as 30 minutes? None =         1   S2 Taking care of household responsibilities? None =         1   S3 Learning a new task, for example, learning how to get to a new place? Moderate =   3   S4 How much of a problem do you have joining community activities (for example, festivals, Rastafarian or other activities) in the same way as anyone else can? Moderate =   3   S5 How much have you been emotionally affected by your health problems? Moderate =   3           In the past 30 days, how much difficulty did you have in:   S6 Concentrating on doing something for ten minutes? Moderate =   3   S7 Walking a long distance such as a kilometer (or equivalent)? None =         1   S8 Washing your whole body? None =         1   S9 Getting dressed? None =         1   S10 Dealing with people you do not know? Mild =           2   S11 Maintaining a friendship? None =         1   S12 Your day to day work? Did not respond to this item      H1 Overall, in the past 30 days, how many days were these difficulties present? Record number of days 30   H2 In the past 30 days, for how many days were you totally unable to carry out your usual activities or work because of any health condition? Record number of days  30   H3 In the past 30 days, not counting the days that you were totally unable, for how many days did you cut back or reduce your usual activities or work because of any health condition? Record number of days 30         Referral / Collaboration:  Referral to another professional/service is not indicated at this time..    Anticipated number of session or this episode of care: 6-12 months      MeasurableTreatment Goal(s) related to diagnosis / functional impairment(s)  Goal 1: Client will experience a reduction in symptoms of anxiety. \"Not getting as " "thrown off.\"    I will know I've met my goal when I can be in situations and not jump to an immediate conclusion that everything is lost, terrible When it's not all-or-nothing.       Objective #A (Client Action)    Status: New - Date: 1/25/18    Client will problem-solve stressors effectively.    Intervention(s)  Therapist will teach problem-solving strategies.   Provide space for client to problem-solve and process in session.      Objective #B  Client will use at least 5 coping skills for anxiety management in the next 5 weeks.               Status: New - Date: 1/25/18      Intervention(s)  Therapist will teach coping strategies, including relaxation strategies.  Assign homework to use/practice outside of sessions.    Objective #C  Client will use cognitive strategies identified in therapy to challenge anxious thoughts.  Status: New - Date: 1/25/18     Intervention(s)  Therapist will teach CBT concepts/strategies (cognitive distortions, thought record).  Assign homework to use/practice outside of sessions.        Goal 2: Client will experience a reduction in symptoms of depression.    I will know I've met my goal when I feel like the anxiety is more under control, which is linked to the depression symptoms. Feeling more confident.      Objective #A (Client Action)    Status: New - Date: 1/25/18     Client will Improve quantity and quality of night time sleep / decrease daytime naps  Identify negative self-talk and behaviors: challenge core beliefs, myths, and actions  Improve concentration, focus, and mindfulness in daily activities .    Intervention(s)  Therapist will teach CBT and DBT techniques/strategies.  Teach sleep hygiene.  Assign homework to use/practice outside of sessions.      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    I will know I've met my goal when I'm able to get the real estate license, and feel confident about the process even when it's " hard.      Objective #A (Client Action)    Status: New - Date: 1/25/18     Client will work on breaking tasks/goals down into smaller parts.    Intervention(s)  Therapist will teach strategies around productivity and task completion.  Assign homework to use/practice outside of sessions.      Client has reviewed and agreed to the above plan.      Carly Gershone, [facility]  January 25, 2018

## 2018-04-16 ENCOUNTER — OFFICE VISIT (OUTPATIENT)
Dept: BEHAVIORAL HEALTH | Facility: CLINIC | Age: 33
End: 2018-04-16
Payer: COMMERCIAL

## 2018-04-16 DIAGNOSIS — F33.1 MAJOR DEPRESSIVE DISORDER, RECURRENT EPISODE, MODERATE (H): ICD-10-CM

## 2018-04-16 DIAGNOSIS — F41.1 GAD (GENERALIZED ANXIETY DISORDER): Primary | ICD-10-CM

## 2018-04-16 PROCEDURE — 90834 PSYTX W PT 45 MINUTES: CPT | Performed by: COUNSELOR

## 2018-04-16 ASSESSMENT — PATIENT HEALTH QUESTIONNAIRE - PHQ9: 5. POOR APPETITE OR OVEREATING: SEVERAL DAYS

## 2018-04-16 ASSESSMENT — ANXIETY QUESTIONNAIRES
6. BECOMING EASILY ANNOYED OR IRRITABLE: NOT AT ALL
1. FEELING NERVOUS, ANXIOUS, OR ON EDGE: SEVERAL DAYS
5. BEING SO RESTLESS THAT IT IS HARD TO SIT STILL: NOT AT ALL
GAD7 TOTAL SCORE: 5
3. WORRYING TOO MUCH ABOUT DIFFERENT THINGS: SEVERAL DAYS
2. NOT BEING ABLE TO STOP OR CONTROL WORRYING: SEVERAL DAYS
7. FEELING AFRAID AS IF SOMETHING AWFUL MIGHT HAPPEN: SEVERAL DAYS

## 2018-04-16 NOTE — PROGRESS NOTES
"                                                     Progress Note    Client Name: Kevin Lackaff Gilligan  Date: 4/16/18         Service Type: Individual      Session Start Time: 10am  Session End Time: 10:45am      Session Length: 45 min     Session #: 7     Attendees: Client attended alone    Treatment Plan Last Reviewed: 1/25/18  PHQ-9 / ALEA-7 : see updates in EPIC     DATA      Progress Since Last Session (Related to Symptoms / Goals / Homework):   Symptoms: Stable    Homework: completed      Episode of Care Goals: Satisfactory progress - ACTION     Current / Ongoing Stressors and Concerns:   Client reported he took another exam for real estate and did very well again. 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 Reported anxiety level has felt lower - reported he is actively meditating and using CBT techniques in his daily life. Discussed that he recently spent time with a trans* friend, which was reportedly positive. Reported his friend is \"[id number]\" about the fact that he is trans*, \"which is so different from how I am about it\". Discussed that he is comfortable with a small group of people knowing this about him, and does not want to tell anyone else at this time.     Treatment Objective(s) Addressed in This Session:   Client will use cognitive strategies identified in therapy to challenge anxious thoughts.  Identify negative self-talk and behaviors: challenge core beliefs, myths, and actions     Intervention:   Reinforced meditation and use of learned skills/techniques.  Facilitated conversation about gender identity and shame.  Validated experience/concerns/feelings.        ASSESSMENT: Current Emotional / Mental Status (status of significant symptoms):   Risk status (Self / Other harm or suicidal ideation)   Client denies current fears or concerns for personal safety.   Client denies current or recent suicidal ideation or behaviors.   Client denies current or recent " homicidal ideation or behaviors.   Client denies current or recent self injurious behavior or ideation.   Client denies other safety concerns.   A safety and risk management plan has not been developed at this time, however client was given the after-hours number / 911 should there be a change in any of these risk factors.     Appearance:   Appropriate    Eye Contact:   Good    Psychomotor Behavior: Normal    Attitude:   Cooperative    Orientation:   All   Speech    Rate / Production: Normal     Volume:  Normal    Mood:    Anxious  Depressed    Affect:    Appropriate    Thought Content:  Rumination    Thought Form:  Coherent  Logical    Insight:    Good      Medication Review:   No changes to current psychiatric medication(s)     Medication Compliance:   Yes     Changes in Health Issues:   None reported     Chemical Use Review:   Substance Use: Chemical use reviewed, no active concerns identified      Tobacco Use: No current tobacco use.       Collateral Reports Completed:   Not Applicable    PLAN: (Client Tasks / Therapist Tasks / Other)  Continue to meet with client approximately every-other week. Continue discussion of stressors and coping. Continue to monitor mood and thought process.        Carly Gershone, [facility]                                                           ________________________________________________________________________    Treatment Plan    Client's Name: Kevin Lackaff Gilligan  YOB: 1985    Date: 1/25/18    DSM-V Diagnoses:            296.32 (F33.1) Major Depressive Disorder, Recurrent Episode, Moderate _  300.02 (F41.1) Generalized Anxiety Disorder  R/O 309.81 (F43.10) Posttraumatic Stress Disorder With dissociative symptoms  Psychosocial & Contextual Factors: Identifies as trans*; history of experiencing significant bullying as a child/adolescent  WHODAS 2.0 (12 item)                          This questionnaire asks about difficulties due to health conditions. Health  conditions                   include                        disease or illnesses, other health problems that may be short or long lasting,                    injuries, mental health or emotional problems, and problems with alcohol or drugs.                              Think back over the past 30 days and answer these questions, thinking about how much              difficulty you had doing the following activities. For each question, please [place] only                   one response.     S1 Standing for long periods such as 30 minutes? None =         1   S2 Taking care of household responsibilities? None =         1   S3 Learning a new task, for example, learning how to get to a new place? Moderate =   3   S4 How much of a problem do you have joining community activities (for example, festivals, Faith or other activities) in the same way as anyone else can? Moderate =   3   S5 How much have you been emotionally affected by your health problems? Moderate =   3           In the past 30 days, how much difficulty did you have in:   S6 Concentrating on doing something for ten minutes? Moderate =   3   S7 Walking a long distance such as a kilometer (or equivalent)? None =         1   S8 Washing your whole body? None =         1   S9 Getting dressed? None =         1   S10 Dealing with people you do not know? Mild =           2   S11 Maintaining a friendship? None =         1   S12 Your day to day work? Did not respond to this item      H1 Overall, in the past 30 days, how many days were these difficulties present? Record number of days 30   H2 In the past 30 days, for how many days were you totally unable to carry out your usual activities or work because of any health condition? Record number of days  30   H3 In the past 30 days, not counting the days that you were totally unable, for how many days did you cut back or reduce your usual activities or work because of any health condition? Record number of days 30  "        Referral / Collaboration:  Referral to another professional/service is not indicated at this time..    Anticipated number of session or this episode of care: 6-12 months      MeasurableTreatment Goal(s) related to diagnosis / functional impairment(s)  Goal 1: Client will experience a reduction in symptoms of anxiety. \"Not getting as thrown off.\"    I will know I've met my goal when I can be in situations and not jump to an immediate conclusion that everything is lost, terrible When it's not all-or-nothing.       Objective #A (Client Action)    Status: New - Date: 1/25/18    Client will problem-solve stressors effectively.    Intervention(s)  Therapist will teach problem-solving strategies.   Provide space for client to problem-solve and process in session.      Objective #B  Client will use at least 5 coping skills for anxiety management in the next 5 weeks.               Status: New - Date: 1/25/18      Intervention(s)  Therapist will teach coping strategies, including relaxation strategies.  Assign homework to use/practice outside of sessions.    Objective #C  Client will use cognitive strategies identified in therapy to challenge anxious thoughts.  Status: New - Date: 1/25/18     Intervention(s)  Therapist will teach CBT concepts/strategies (cognitive distortions, thought record).  Assign homework to use/practice outside of sessions.        Goal 2: Client will experience a reduction in symptoms of depression.    I will know I've met my goal when I feel like the anxiety is more under control, which is linked to the depression symptoms. Feeling more confident.      Objective #A (Client Action)    Status: New - Date: 1/25/18     Client will Improve quantity and quality of night time sleep / decrease daytime naps  Identify negative self-talk and behaviors: challenge core beliefs, myths, and actions  Improve concentration, focus, and mindfulness in daily activities .    Intervention(s)  Therapist will teach " CBT and DBT techniques/strategies.  Teach sleep hygiene.  Assign homework to use/practice outside of sessions.      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    I will know I've met my goal when I'm able to get the real estate license, and feel confident about the process even when it's hard.      Objective #A (Client Action)    Status: New - Date: 1/25/18     Client will work on breaking tasks/goals down into smaller parts.    Intervention(s)  Therapist will teach strategies around productivity and task completion.  Assign homework to use/practice outside of sessions.      Client has reviewed and agreed to the above plan.      Carly Gershone, [facility]  January 25, 2018

## 2018-04-17 ASSESSMENT — PATIENT HEALTH QUESTIONNAIRE - PHQ9: SUM OF ALL RESPONSES TO PHQ QUESTIONS 1-9: 8

## 2018-04-17 ASSESSMENT — ANXIETY QUESTIONNAIRES: GAD7 TOTAL SCORE: 5

## 2018-05-16 ENCOUNTER — OFFICE VISIT (OUTPATIENT)
Dept: BEHAVIORAL HEALTH | Facility: CLINIC | Age: 33
End: 2018-05-16
Payer: COMMERCIAL

## 2018-05-16 DIAGNOSIS — F41.1 GAD (GENERALIZED ANXIETY DISORDER): Primary | ICD-10-CM

## 2018-05-16 DIAGNOSIS — F33.1 MAJOR DEPRESSIVE DISORDER, RECURRENT EPISODE, MODERATE (H): ICD-10-CM

## 2018-05-16 PROCEDURE — 90834 PSYTX W PT 45 MINUTES: CPT | Performed by: COUNSELOR

## 2018-05-16 ASSESSMENT — ANXIETY QUESTIONNAIRES
5. BEING SO RESTLESS THAT IT IS HARD TO SIT STILL: NOT AT ALL
2. NOT BEING ABLE TO STOP OR CONTROL WORRYING: SEVERAL DAYS
1. FEELING NERVOUS, ANXIOUS, OR ON EDGE: SEVERAL DAYS
GAD7 TOTAL SCORE: 5
7. FEELING AFRAID AS IF SOMETHING AWFUL MIGHT HAPPEN: SEVERAL DAYS
6. BECOMING EASILY ANNOYED OR IRRITABLE: NOT AT ALL
3. WORRYING TOO MUCH ABOUT DIFFERENT THINGS: SEVERAL DAYS

## 2018-05-16 ASSESSMENT — PATIENT HEALTH QUESTIONNAIRE - PHQ9: 5. POOR APPETITE OR OVEREATING: SEVERAL DAYS

## 2018-05-17 ASSESSMENT — PATIENT HEALTH QUESTIONNAIRE - PHQ9: SUM OF ALL RESPONSES TO PHQ QUESTIONS 1-9: 7

## 2018-05-17 ASSESSMENT — ANXIETY QUESTIONNAIRES: GAD7 TOTAL SCORE: 5

## 2018-05-22 NOTE — PROGRESS NOTES
Progress Note    Client Name: Kevin Lackaff Gilligan  Date: 5/16/18         Service Type: Individual      Session Start Time: 10am  Session End Time: 10:50am      Session Length: 50 min     Session #: 8     Attendees: Client attended alone    Treatment Plan Last Reviewed: 1/25/18  PHQ-9 / ALEA-7 : see updates in EPIC     DATA      Progress Since Last Session (Related to Symptoms / Goals / Homework):   Symptoms: Stable    Homework: completed      Episode of Care Goals: Satisfactory progress - ACTION     Current / Ongoing Stressors and Concerns:   Client reported he sometimes attends a social gathering with other people who identify as trans*. Reported these meetings cause significant discomfort. Discussed that very few people in his life know he is trans*, and it is therefore scary to discuss it, particularly in public places or with people he does not know as well.      Treatment Objective(s) Addressed in This Session:   Client will use cognitive strategies identified in therapy to challenge anxious thoughts.  Identify negative self-talk and behaviors: challenge core beliefs, myths, and actions     Intervention:   Reinforced use of learned skills.  Psychoeducation around hypervigilance.   Facilitated conversation about gender identity and shame.  Validated experience/concerns/feelings.        ASSESSMENT: Current Emotional / Mental Status (status of significant symptoms):   Risk status (Self / Other harm or suicidal ideation)   Client denies current fears or concerns for personal safety.   Client denies current or recent suicidal ideation or behaviors.   Client denies current or recent homicidal ideation or behaviors.   Client denies current or recent self injurious behavior or ideation.   Client denies other safety concerns.   A safety and risk management plan has not been developed at this time, however client was given the after-hours number / 911 should  there be a change in any of these risk factors.     Appearance:   Appropriate    Eye Contact:   Good    Psychomotor Behavior: Normal    Attitude:   Cooperative    Orientation:   All   Speech    Rate / Production: Normal     Volume:  Normal    Mood:    Anxious  Depressed    Affect:    Appropriate    Thought Content:  Rumination    Thought Form:  Coherent  Logical    Insight:    Good      Medication Review:   No changes to current psychiatric medication(s)     Medication Compliance:   Yes     Changes in Health Issues:   None reported     Chemical Use Review:   Substance Use: Chemical use reviewed, no active concerns identified      Tobacco Use: No current tobacco use.       Collateral Reports Completed:   Not Applicable    PLAN: (Client Tasks / Therapist Tasks / Other)  Continue to meet with client approximately every-other week. Continue discussion of stressors and coping. Continue to monitor mood and thought process.        Carly Gershone, [facility]                                                           ________________________________________________________________________    Treatment Plan    Client's Name: Kevin Lackaff Gilligan  YOB: 1985    Date: 1/25/18    DSM-V Diagnoses:            296.32 (F33.1) Major Depressive Disorder, Recurrent Episode, Moderate _  300.02 (F41.1) Generalized Anxiety Disorder  R/O 309.81 (F43.10) Posttraumatic Stress Disorder With dissociative symptoms  Psychosocial & Contextual Factors: Identifies as trans*; history of experiencing significant bullying as a child/adolescent  WHODAS 2.0 (12 item)                          This questionnaire asks about difficulties due to health conditions. Health conditions                   include                        disease or illnesses, other health problems that may be short or long lasting,                    injuries, mental health or emotional problems, and problems with alcohol or drugs.                              Think back  over the past 30 days and answer these questions, thinking about how much              difficulty you had doing the following activities. For each question, please [place] only                   one response.     S1 Standing for long periods such as 30 minutes? None =         1   S2 Taking care of household responsibilities? None =         1   S3 Learning a new task, for example, learning how to get to a new place? Moderate =   3   S4 How much of a problem do you have joining community activities (for example, festivals, Yazidism or other activities) in the same way as anyone else can? Moderate =   3   S5 How much have you been emotionally affected by your health problems? Moderate =   3           In the past 30 days, how much difficulty did you have in:   S6 Concentrating on doing something for ten minutes? Moderate =   3   S7 Walking a long distance such as a kilometer (or equivalent)? None =         1   S8 Washing your whole body? None =         1   S9 Getting dressed? None =         1   S10 Dealing with people you do not know? Mild =           2   S11 Maintaining a friendship? None =         1   S12 Your day to day work? Did not respond to this item      H1 Overall, in the past 30 days, how many days were these difficulties present? Record number of days 30   H2 In the past 30 days, for how many days were you totally unable to carry out your usual activities or work because of any health condition? Record number of days  30   H3 In the past 30 days, not counting the days that you were totally unable, for how many days did you cut back or reduce your usual activities or work because of any health condition? Record number of days 30         Referral / Collaboration:  Referral to another professional/service is not indicated at this time..    Anticipated number of session or this episode of care: 6-12 months      MeasurableTreatment Goal(s) related to diagnosis / functional impairment(s)  Goal 1: Client will  "experience a reduction in symptoms of anxiety. \"Not getting as thrown off.\"    I will know I've met my goal when I can be in situations and not jump to an immediate conclusion that everything is lost, terrible When it's not all-or-nothing.       Objective #A (Client Action)    Status: New - Date: 1/25/18    Client will problem-solve stressors effectively.    Intervention(s)  Therapist will teach problem-solving strategies.   Provide space for client to problem-solve and process in session.      Objective #B  Client will use at least 5 coping skills for anxiety management in the next 5 weeks.               Status: New - Date: 1/25/18      Intervention(s)  Therapist will teach coping strategies, including relaxation strategies.  Assign homework to use/practice outside of sessions.    Objective #C  Client will use cognitive strategies identified in therapy to challenge anxious thoughts.  Status: New - Date: 1/25/18     Intervention(s)  Therapist will teach CBT concepts/strategies (cognitive distortions, thought record).  Assign homework to use/practice outside of sessions.        Goal 2: Client will experience a reduction in symptoms of depression.    I will know I've met my goal when I feel like the anxiety is more under control, which is linked to the depression symptoms. Feeling more confident.      Objective #A (Client Action)    Status: New - Date: 1/25/18     Client will Improve quantity and quality of night time sleep / decrease daytime naps  Identify negative self-talk and behaviors: challenge core beliefs, myths, and actions  Improve concentration, focus, and mindfulness in daily activities .    Intervention(s)  Therapist will teach CBT and DBT techniques/strategies.  Teach sleep hygiene.  Assign homework to use/practice outside of sessions.      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    I will know I've met my goal when I'm able to get the real " estate license, and feel confident about the process even when it's hard.      Objective #A (Client Action)    Status: New - Date: 1/25/18     Client will work on breaking tasks/goals down into smaller parts.    Intervention(s)  Therapist will teach strategies around productivity and task completion.  Assign homework to use/practice outside of sessions.      Client has reviewed and agreed to the above plan.      Carly Gershone, [facility]  January 25, 2018

## 2018-06-04 ENCOUNTER — OFFICE VISIT (OUTPATIENT)
Dept: BEHAVIORAL HEALTH | Facility: CLINIC | Age: 33
End: 2018-06-04
Payer: COMMERCIAL

## 2018-06-04 DIAGNOSIS — F33.1 MAJOR DEPRESSIVE DISORDER, RECURRENT EPISODE, MODERATE (H): ICD-10-CM

## 2018-06-04 DIAGNOSIS — F41.1 GAD (GENERALIZED ANXIETY DISORDER): Primary | ICD-10-CM

## 2018-06-04 DIAGNOSIS — Z87.890 TRANS-SEXUALISM, STATUS POST GENDER REASSIGNMENT SURGERY: ICD-10-CM

## 2018-06-04 PROCEDURE — 90834 PSYTX W PT 45 MINUTES: CPT | Performed by: COUNSELOR

## 2018-06-05 RX ORDER — TESTOSTERONE 12.5 MG/1.25G
GEL TOPICAL
Qty: 50 G | Refills: 0 | Status: CANCELLED | OUTPATIENT
Start: 2018-06-05

## 2018-06-05 NOTE — TELEPHONE ENCOUNTER
Needs OV.  Last seen for adjustment disorder 7/2016.  Seen 4/2017 for abdominal pain.  cinvolve message sent to patient asking him to schedule appointment.  Ana Delaney RN  Requested Prescriptions   Pending Prescriptions Disp Refills     testosterone (ANDROGEL/TESTIM) 50 MG/5GM (1%) topical gel [Pharmacy Med Name: TESTOSTERONE 1%(50MG) GEL 30X5GM PK] 150 g 0     Sig: PLACE 2 PACKAGES(100MG) ONTO THE SKIN DAILY, APPLY TO CLEAN DRY INTACT SKIN OF THE SHOULDERS, UPPER ARM OR ABDOMEN    There is no refill protocol information for this order

## 2018-06-08 ENCOUNTER — OFFICE VISIT (OUTPATIENT)
Dept: FAMILY MEDICINE | Facility: CLINIC | Age: 33
End: 2018-06-08
Payer: COMMERCIAL

## 2018-06-08 VITALS
BODY MASS INDEX: 23.3 KG/M2 | TEMPERATURE: 96.9 F | OXYGEN SATURATION: 97 % | DIASTOLIC BLOOD PRESSURE: 78 MMHG | WEIGHT: 145 LBS | HEIGHT: 66 IN | SYSTOLIC BLOOD PRESSURE: 114 MMHG | HEART RATE: 83 BPM

## 2018-06-08 DIAGNOSIS — Z87.890 TRANS-SEXUALISM, STATUS POST GENDER REASSIGNMENT SURGERY: Primary | ICD-10-CM

## 2018-06-08 LAB
ALBUMIN SERPL-MCNC: 4.6 G/DL (ref 3.4–5)
ALP SERPL-CCNC: 89 U/L (ref 40–150)
ALT SERPL W P-5'-P-CCNC: 31 U/L (ref 0–70)
ANION GAP SERPL CALCULATED.3IONS-SCNC: 8 MMOL/L (ref 3–14)
AST SERPL W P-5'-P-CCNC: 19 U/L (ref 0–45)
BASOPHILS # BLD AUTO: 0 10E9/L (ref 0–0.2)
BASOPHILS NFR BLD AUTO: 0.4 %
BILIRUB SERPL-MCNC: 0.7 MG/DL (ref 0.2–1.3)
BUN SERPL-MCNC: 14 MG/DL (ref 7–30)
CALCIUM SERPL-MCNC: 10 MG/DL (ref 8.5–10.1)
CHLORIDE SERPL-SCNC: 107 MMOL/L (ref 94–109)
CHOLEST SERPL-MCNC: 184 MG/DL
CO2 SERPL-SCNC: 26 MMOL/L (ref 20–32)
CREAT SERPL-MCNC: 0.93 MG/DL (ref 0.66–1.25)
DIFFERENTIAL METHOD BLD: NORMAL
EOSINOPHIL # BLD AUTO: 0.1 10E9/L (ref 0–0.7)
EOSINOPHIL NFR BLD AUTO: 1.5 %
ERYTHROCYTE [DISTWIDTH] IN BLOOD BY AUTOMATED COUNT: 12 % (ref 10–15)
GFR SERPL CREATININE-BSD FRML MDRD: >90 ML/MIN/1.7M2
GLUCOSE SERPL-MCNC: 83 MG/DL (ref 70–99)
HCT VFR BLD AUTO: 48.2 % (ref 40–53)
HDLC SERPL-MCNC: 65 MG/DL
HGB BLD-MCNC: 16.6 G/DL (ref 13.3–17.7)
LDLC SERPL CALC-MCNC: 108 MG/DL
LYMPHOCYTES # BLD AUTO: 1.7 10E9/L (ref 0.8–5.3)
LYMPHOCYTES NFR BLD AUTO: 35.9 %
MCH RBC QN AUTO: 30.5 PG (ref 26.5–33)
MCHC RBC AUTO-ENTMCNC: 34.4 G/DL (ref 31.5–36.5)
MCV RBC AUTO: 88 FL (ref 78–100)
MONOCYTES # BLD AUTO: 0.3 10E9/L (ref 0–1.3)
MONOCYTES NFR BLD AUTO: 7.1 %
NEUTROPHILS # BLD AUTO: 2.6 10E9/L (ref 1.6–8.3)
NEUTROPHILS NFR BLD AUTO: 55.1 %
NONHDLC SERPL-MCNC: 119 MG/DL
PLATELET # BLD AUTO: 282 10E9/L (ref 150–450)
POTASSIUM SERPL-SCNC: 4.7 MMOL/L (ref 3.4–5.3)
PROT SERPL-MCNC: 8.4 G/DL (ref 6.8–8.8)
RBC # BLD AUTO: 5.45 10E12/L (ref 4.4–5.9)
SODIUM SERPL-SCNC: 141 MMOL/L (ref 133–144)
TRIGL SERPL-MCNC: 54 MG/DL
WBC # BLD AUTO: 4.8 10E9/L (ref 4–11)

## 2018-06-08 PROCEDURE — 85025 COMPLETE CBC W/AUTO DIFF WBC: CPT | Performed by: PHYSICIAN ASSISTANT

## 2018-06-08 PROCEDURE — 84403 ASSAY OF TOTAL TESTOSTERONE: CPT | Performed by: PHYSICIAN ASSISTANT

## 2018-06-08 PROCEDURE — 80061 LIPID PANEL: CPT | Performed by: PHYSICIAN ASSISTANT

## 2018-06-08 PROCEDURE — 99213 OFFICE O/P EST LOW 20 MIN: CPT | Performed by: PHYSICIAN ASSISTANT

## 2018-06-08 PROCEDURE — 80053 COMPREHEN METABOLIC PANEL: CPT | Performed by: PHYSICIAN ASSISTANT

## 2018-06-08 PROCEDURE — 36415 COLL VENOUS BLD VENIPUNCTURE: CPT | Performed by: PHYSICIAN ASSISTANT

## 2018-06-08 RX ORDER — TESTOSTERONE 12.5 MG/1.25G
100 GEL TOPICAL DAILY
Qty: 60 PACKET | Refills: 5 | Status: SHIPPED | OUTPATIENT
Start: 2018-06-08 | End: 2019-01-24

## 2018-06-08 ASSESSMENT — ANXIETY QUESTIONNAIRES
6. BECOMING EASILY ANNOYED OR IRRITABLE: NOT AT ALL
IF YOU CHECKED OFF ANY PROBLEMS ON THIS QUESTIONNAIRE, HOW DIFFICULT HAVE THESE PROBLEMS MADE IT FOR YOU TO DO YOUR WORK, TAKE CARE OF THINGS AT HOME, OR GET ALONG WITH OTHER PEOPLE: SOMEWHAT DIFFICULT
5. BEING SO RESTLESS THAT IT IS HARD TO SIT STILL: NOT AT ALL
7. FEELING AFRAID AS IF SOMETHING AWFUL MIGHT HAPPEN: SEVERAL DAYS
3. WORRYING TOO MUCH ABOUT DIFFERENT THINGS: SEVERAL DAYS
2. NOT BEING ABLE TO STOP OR CONTROL WORRYING: SEVERAL DAYS
GAD7 TOTAL SCORE: 5
1. FEELING NERVOUS, ANXIOUS, OR ON EDGE: SEVERAL DAYS

## 2018-06-08 ASSESSMENT — PATIENT HEALTH QUESTIONNAIRE - PHQ9: 5. POOR APPETITE OR OVEREATING: SEVERAL DAYS

## 2018-06-08 NOTE — PROGRESS NOTES
"                                                         Progress Note    Client Name: Kevin Lackaff Gilligan  Date: 6/4/18         Service Type: Individual      Session Start Time: 10am  Session End Time: 10:50am      Session Length: 50 min     Session #: 9     Attendees: Client attended alone    Treatment Plan Last Reviewed: 1/25/18  PHQ-9 / ALEA-7 : see updates in EPIC     DATA      Progress Since Last Session (Related to Symptoms / Goals / Homework):   Symptoms: Stable    Homework: completed      Episode of Care Goals: Satisfactory progress - ACTION     Current / Ongoing Stressors and Concerns:   Client reported his grandfather is quite ill, and his parents are out of town to help care for him. Discussed somewhat complicated relationship with his grandfather, as he has not been as accepting of client being trans*. 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 Said he is trying to \"stay in the moment\" about it and remember that he can retake it for free if needed. Reported he was able to connect with a friend who also has anxiety symptoms, which \"felt really good\".     Treatment Objective(s) Addressed in This Session:   Client will use cognitive strategies identified in therapy to challenge anxious thoughts.  Identify negative self-talk and behaviors: challenge core beliefs, myths, and actions     Intervention:   Reinforced use of learned skills.   Recommended Charlee Bess' work on Ambiguous Loss.  Validated experience/concerns/feelings.        ASSESSMENT: Current Emotional / Mental Status (status of significant symptoms):   Risk status (Self / Other harm or suicidal ideation)   Client denies current fears or concerns for personal safety.   Client denies current or recent suicidal ideation or behaviors.   Client denies current or recent homicidal ideation or behaviors.   Client denies current or recent self injurious behavior or ideation.   Client denies other " safety concerns.   A safety and risk management plan has not been developed at this time, however client was given the after-hours number / 911 should there be a change in any of these risk factors.     Appearance:   Appropriate    Eye Contact:   Good    Psychomotor Behavior: Normal    Attitude:   Cooperative    Orientation:   All   Speech    Rate / Production: Normal     Volume:  Normal    Mood:    Anxious  Depressed    Affect:    Appropriate    Thought Content:  Rumination    Thought Form:  Coherent  Logical    Insight:    Good      Medication Review:   No changes to current psychiatric medication(s)     Medication Compliance:   Yes     Changes in Health Issues:   None reported     Chemical Use Review:   Substance Use: Chemical use reviewed, no active concerns identified      Tobacco Use: No current tobacco use.       Collateral Reports Completed:   Not Applicable    PLAN: (Client Tasks / Therapist Tasks / Other)  Continue to meet with client approximately every-other week. Continue discussion of stressors and coping. Continue to monitor mood and thought process.        Carly Gershone, [facility]                                                           ________________________________________________________________________    Treatment Plan    Client's Name: Kevin Lackaff Gilligan  YOB: 1985    Date: 1/25/18    DSM-V Diagnoses:            296.32 (F33.1) Major Depressive Disorder, Recurrent Episode, Moderate _  300.02 (F41.1) Generalized Anxiety Disorder  R/O 309.81 (F43.10) Posttraumatic Stress Disorder With dissociative symptoms  Psychosocial & Contextual Factors: Identifies as trans*; history of experiencing significant bullying as a child/adolescent  WHODAS 2.0 (12 item)                          This questionnaire asks about difficulties due to health conditions. Health conditions                   include                        disease or illnesses, other health problems that may be short or long  lasting,                    injuries, mental health or emotional problems, and problems with alcohol or drugs.                              Think back over the past 30 days and answer these questions, thinking about how much              difficulty you had doing the following activities. For each question, please [place] only                   one response.     S1 Standing for long periods such as 30 minutes? None =         1   S2 Taking care of household responsibilities? None =         1   S3 Learning a new task, for example, learning how to get to a new place? Moderate =   3   S4 How much of a problem do you have joining community activities (for example, festivals, Congregational or other activities) in the same way as anyone else can? Moderate =   3   S5 How much have you been emotionally affected by your health problems? Moderate =   3           In the past 30 days, how much difficulty did you have in:   S6 Concentrating on doing something for ten minutes? Moderate =   3   S7 Walking a long distance such as a kilometer (or equivalent)? None =         1   S8 Washing your whole body? None =         1   S9 Getting dressed? None =         1   S10 Dealing with people you do not know? Mild =           2   S11 Maintaining a friendship? None =         1   S12 Your day to day work? Did not respond to this item      H1 Overall, in the past 30 days, how many days were these difficulties present? Record number of days 30   H2 In the past 30 days, for how many days were you totally unable to carry out your usual activities or work because of any health condition? Record number of days  30   H3 In the past 30 days, not counting the days that you were totally unable, for how many days did you cut back or reduce your usual activities or work because of any health condition? Record number of days 30         Referral / Collaboration:  Referral to another professional/service is not indicated at this time..    Anticipated number of  "session or this episode of care: 6-12 months      MeasurableTreatment Goal(s) related to diagnosis / functional impairment(s)  Goal 1: Client will experience a reduction in symptoms of anxiety. \"Not getting as thrown off.\"    I will know I've met my goal when I can be in situations and not jump to an immediate conclusion that everything is lost, terrible When it's not all-or-nothing.       Objective #A (Client Action)    Status: New - Date: 1/25/18    Client will problem-solve stressors effectively.    Intervention(s)  Therapist will teach problem-solving strategies.   Provide space for client to problem-solve and process in session.      Objective #B  Client will use at least 5 coping skills for anxiety management in the next 5 weeks.               Status: New - Date: 1/25/18      Intervention(s)  Therapist will teach coping strategies, including relaxation strategies.  Assign homework to use/practice outside of sessions.    Objective #C  Client will use cognitive strategies identified in therapy to challenge anxious thoughts.  Status: New - Date: 1/25/18     Intervention(s)  Therapist will teach CBT concepts/strategies (cognitive distortions, thought record).  Assign homework to use/practice outside of sessions.        Goal 2: Client will experience a reduction in symptoms of depression.    I will know I've met my goal when I feel like the anxiety is more under control, which is linked to the depression symptoms. Feeling more confident.      Objective #A (Client Action)    Status: New - Date: 1/25/18     Client will Improve quantity and quality of night time sleep / decrease daytime naps  Identify negative self-talk and behaviors: challenge core beliefs, myths, and actions  Improve concentration, focus, and mindfulness in daily activities .    Intervention(s)  Therapist will teach CBT and DBT techniques/strategies.  Teach sleep hygiene.  Assign homework to use/practice outside of sessions.      Goal 3: Client will " consistently work toward personal goals, such as obtaining real estate license.    I will know I've met my goal when I'm able to get the real estate license, and feel confident about the process even when it's hard.      Objective #A (Client Action)    Status: New - Date: 1/25/18     Client will work on breaking tasks/goals down into smaller parts.    Intervention(s)  Therapist will teach strategies around productivity and task completion.  Assign homework to use/practice outside of sessions.      Client has reviewed and agreed to the above plan.      Carly Gershone, [facility]  January 25, 2018

## 2018-06-08 NOTE — PROGRESS NOTES
"  SUBJECTIVE:   Kevin Lackaff Gilligan is a 33 year old male who presents to clinic today for the following health issues:      Chief Complaint   Patient presents with     Follow Up For     recheck medications and labs             Problem list and histories reviewed & adjusted, as indicated.  Additional history: 34 y/o male here for yearly follow up.  Lele has been doing very well since we have seen him last.  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  Lele is s/p gender reassignment and has been stable on testosterone replacement.      Continues to follow with counseling, going well.      BP Readings from Last 3 Encounters:   06/08/18 114/78   04/06/17 110/70   07/19/16 127/78    Wt Readings from Last 3 Encounters:   06/08/18 145 lb (65.8 kg)   04/06/17 148 lb 8 oz (67.4 kg)   07/19/16 146 lb (66.2 kg)                    Reviewed and updated as needed this visit by clinical staff       Reviewed and updated as needed this visit by Provider         ROS:  Constitutional, HEENT, cardiovascular, pulmonary, gi and gu systems are negative, except as otherwise noted.    OBJECTIVE:     /78 (BP Location: Right arm, Cuff Size: Adult Large)  Pulse 83  Temp 96.9  F (36.1  C) (Oral)  Ht 5' 6\" (1.676 m)  Wt 145 lb (65.8 kg)  SpO2 97%  BMI 23.4 kg/m2  Body mass index is 23.4 kg/(m^2).  GENERAL: alert and no distress  EYES: Eyes grossly normal to inspection  PSYCH: mentation appears normal, affect normal/bright    Diagnostic Test Results:  none     ASSESSMENT/PLAN:             1. Trans-sexualism, status post gender reassignment surgery  Will update labs, continue testosterone replacement.    - CBC with platelets differential  - Testosterone, total  - testosterone (ANDROGEL) 50 MG/5GM (1%) topical gel; Place 2 packets (100 mg) onto the skin daily Apply to the clean, dry intact skin of the shoulders, upper arms or abdomen.  Dispense: 60 packet; Refill: 5  - Lipid panel reflex to direct LDL Fasting  - " Comprehensive metabolic panel        Davis Awad PA-C  [facility]

## 2018-06-08 NOTE — MR AVS SNAPSHOT
After Visit Summary   6/8/2018    Kevin Lackaff Gilligan    MRN: [id number]           Patient Information     Date Of Birth          1985        Visit Information        Provider Department      6/8/2018 10:20 AM Davis Awad PA-C [facility]        Today's Diagnoses     Trans-sexualism, status post gender reassignment surgery    -  1       Follow-ups after your visit        Your next 10 appointments already scheduled     Jun 21, 2018 10:00 AM CDT   Return Visit with Carly Gershone, [facility] ([address] [id number] [phone]            Jul 11, 2018  1:30 PM CDT   Return Visit with Carly Gershone, [facility] ([address] [address] [phone]              Who to contact     If you have questions or need follow up information about today's clinic visit or your schedule please contact [facility] directly at [phone].  Normal or non-critical lab and imaging results will be communicated to you by "[employer]hart, letter or phone within 4 business days after the clinic has received the results. If you do not hear from us within 7 days, please contact the clinic through "[employer]hart or phone. If you have a critical or abnormal lab result, we will notify you by phone as soon as possible.  Submit refill requests through [employer] or call your pharmacy and they will forward the refill request to us. Please allow 3 business days for your refill to be completed.          Additional Information About Your Visit        MyChart Information     [employer] gives you secure access to your electronic health record. If you see a primary care provider, you can also send messages to your care team and make appointments. If you have questions, please call your primary care clinic.  If you do not have a primary care provider, please call [phone] and  "they will assist you.        Care EveryWhere ID     This is your Care EveryWhere ID. This could be used by other organizations to access your [facility] medical records  [id number]        Your Vitals Were     Pulse Temperature Height Pulse Oximetry BMI (Body Mass Index)       83 96.9  F (36.1  C) (Oral) 5' 6\" (1.676 m) 97% 23.4 kg/m2        Blood Pressure from Last 3 Encounters:   06/08/18 114/78   04/06/17 110/70   07/19/16 127/78    Weight from Last 3 Encounters:   06/08/18 145 lb (65.8 kg)   04/06/17 148 lb 8 oz (67.4 kg)   07/19/16 146 lb (66.2 kg)              We Performed the Following     CBC with platelets differential     Comprehensive metabolic panel     Lipid panel reflex to direct LDL Fasting     Testosterone, total          Today's Medication Changes          These changes are accurate as of 6/8/18 10:34 AM.  If you have any questions, ask your nurse or doctor.               Stop taking these medicines if you haven't already. Please contact your care team if you have questions.     DULoxetine 60 MG EC capsule   Commonly known as:  CYMBALTA   Stopped by:  Davis Awad PA-C                Where to get your medicines      Some of these will need a paper prescription and others can be bought over the counter.  Ask your nurse if you have questions.     Bring a paper prescription for each of these medications     testosterone 50 MG/5GM (1%) topical gel                Primary Care Provider Office Phone # Fax #    AkronElza Ruano [phone] [phone]        Equal Access to Services     [facility]: Hadii deyvi Mazariegos, waaxda luqadaha, qaybta kaaldennis nunezdora idiin hayaan adeeg kharash la'aan . So [facility] [phone].    ATENCIÓN: Si habla español, tiene a miller disposición servicios gratuitos de asistencia lingüística. Llame al [phone].    We comply with applicable federal civil rights laws and [place] laws. " We do not discriminate on the basis of race, color, national origin, age, disability, sex, sexual orientation, or gender identity.            Thank you!     Thank you for choosing [facility]  for your care. Our goal is always to provide you with excellent care. Hearing back from our patients is one way we can continue to improve our services. Please take a few minutes to complete the written survey that you may receive in the mail after your visit with us. Thank you!             Your Updated Medication List - Protect others around you: Learn how to safely use, store and throw away your medicines at www.disposemymeds.org.          This list is accurate as of 6/8/18 10:34 AM.  Always use your most recent med list.                   Brand Name Dispense Instructions for use Diagnosis    citalopram 20 MG tablet    celeXA     Take 20 mg by mouth daily        LORazepam 1 MG tablet    ATIVAN     Take 1 mg by mouth daily as needed        testosterone 50 MG/5GM (1%) topical gel    ANDROGEL    60 packet    Place 2 packets (100 mg) onto the skin daily Apply to the clean, dry intact skin of the shoulders, upper arms or abdomen.    Trans-sexualism, status post gender reassignment surgery

## 2018-06-09 LAB — TESTOST SERPL-MCNC: 266 NG/DL (ref 240–950)

## 2018-06-09 ASSESSMENT — ANXIETY QUESTIONNAIRES: GAD7 TOTAL SCORE: 5

## 2018-06-11 NOTE — PROGRESS NOTES
Dear Lele    Your test results are attached, feel free to contact me via MyChart     Labs look stable from last year.  Keep up the good work!    Gene Awad PA-C

## 2019-08-28 ENCOUNTER — OFFICE VISIT (OUTPATIENT)
Dept: FAMILY MEDICINE | Facility: CLINIC | Age: 34
End: 2019-08-28
Payer: COMMERCIAL

## 2019-08-28 VITALS
HEIGHT: 66 IN | RESPIRATION RATE: 16 BRPM | WEIGHT: 143.4 LBS | HEART RATE: 78 BPM | BODY MASS INDEX: 23.05 KG/M2 | TEMPERATURE: 98.5 F | DIASTOLIC BLOOD PRESSURE: 76 MMHG | OXYGEN SATURATION: 100 % | SYSTOLIC BLOOD PRESSURE: 124 MMHG

## 2019-08-28 DIAGNOSIS — Z00.00 ROUTINE GENERAL MEDICAL EXAMINATION AT A HEALTH CARE FACILITY: Primary | ICD-10-CM

## 2019-08-28 DIAGNOSIS — Z87.890 TRANS-SEXUALISM, STATUS POST GENDER REASSIGNMENT SURGERY: ICD-10-CM

## 2019-08-28 LAB
ERYTHROCYTE [DISTWIDTH] IN BLOOD BY AUTOMATED COUNT: 12.3 % (ref 10–15)
HCT VFR BLD AUTO: 44.7 % (ref 40–53)
HGB BLD-MCNC: 15.1 G/DL (ref 13.3–17.7)
MCH RBC QN AUTO: 30.9 PG (ref 26.5–33)
MCHC RBC AUTO-ENTMCNC: 33.8 G/DL (ref 31.5–36.5)
MCV RBC AUTO: 92 FL (ref 78–100)
PLATELET # BLD AUTO: 284 10E9/L (ref 150–450)
RBC # BLD AUTO: 4.88 10E12/L (ref 4.4–5.9)
WBC # BLD AUTO: 5.8 10E9/L (ref 4–11)

## 2019-08-28 PROCEDURE — 99395 PREV VISIT EST AGE 18-39: CPT | Performed by: FAMILY MEDICINE

## 2019-08-28 PROCEDURE — 99213 OFFICE O/P EST LOW 20 MIN: CPT | Mod: 25 | Performed by: FAMILY MEDICINE

## 2019-08-28 PROCEDURE — 36415 COLL VENOUS BLD VENIPUNCTURE: CPT | Performed by: FAMILY MEDICINE

## 2019-08-28 PROCEDURE — 84403 ASSAY OF TOTAL TESTOSTERONE: CPT | Performed by: FAMILY MEDICINE

## 2019-08-28 PROCEDURE — 85027 COMPLETE CBC AUTOMATED: CPT | Performed by: FAMILY MEDICINE

## 2019-08-28 PROCEDURE — 80061 LIPID PANEL: CPT | Performed by: FAMILY MEDICINE

## 2019-08-28 RX ORDER — TESTOSTERONE GEL, 1% 10 MG/G
100 GEL TRANSDERMAL DAILY
Qty: 3 BOX | Refills: 5 | Status: SHIPPED | OUTPATIENT
Start: 2019-08-28 | End: 2024-09-16

## 2019-08-28 ASSESSMENT — ANXIETY QUESTIONNAIRES
1. FEELING NERVOUS, ANXIOUS, OR ON EDGE: SEVERAL DAYS
2. NOT BEING ABLE TO STOP OR CONTROL WORRYING: SEVERAL DAYS
IF YOU CHECKED OFF ANY PROBLEMS ON THIS QUESTIONNAIRE, HOW DIFFICULT HAVE THESE PROBLEMS MADE IT FOR YOU TO DO YOUR WORK, TAKE CARE OF THINGS AT HOME, OR GET ALONG WITH OTHER PEOPLE: NOT DIFFICULT AT ALL
5. BEING SO RESTLESS THAT IT IS HARD TO SIT STILL: NOT AT ALL
GAD7 TOTAL SCORE: 5
6. BECOMING EASILY ANNOYED OR IRRITABLE: NOT AT ALL
3. WORRYING TOO MUCH ABOUT DIFFERENT THINGS: SEVERAL DAYS
7. FEELING AFRAID AS IF SOMETHING AWFUL MIGHT HAPPEN: SEVERAL DAYS

## 2019-08-28 ASSESSMENT — MIFFLIN-ST. JEOR: SCORE: 1543.21

## 2019-08-28 ASSESSMENT — PATIENT HEALTH QUESTIONNAIRE - PHQ9
5. POOR APPETITE OR OVEREATING: SEVERAL DAYS
SUM OF ALL RESPONSES TO PHQ QUESTIONS 1-9: 3

## 2019-08-28 NOTE — PROGRESS NOTES
SUBJECTIVE:   CC: Kevin Lackaff Gilligan is an 34 year old male who presents for preventative health visit.     Healthy Habits:     Getting at least 3 servings of Calcium per day:  Yes    Bi-annual eye exam:  NO    Dental care twice a year:  Yes    Sleep apnea or symptoms of sleep apnea:  None    Diet:  Regular (no restrictions)    Frequency of exercise:  2-3 days/week    Duration of exercise:  15-30 minutes    Taking medications regularly:  Yes    Medication side effects:  None    PHQ-2 Total Score: 1    Additional concerns today:  No    (Z00.00) Routine general medical examination at a health care facility  (primary encounter diagnosis)  Comment: reviewed routine well adult cares  Plan: CBC with platelets, Testosterone, total, Lipid         panel reflex to direct LDL Fasting            (Z87.890) Trans-sexualism, status post gender reassignment surgery  Comment: due for labs  Tolerating same dose of meds no side effects  Plan: CBC with platelets, Testosterone, total, Lipid         panel reflex to direct LDL Fasting,         testosterone (ANDROGEL/TESTIM) 50 MG/5GM (1%)         topical gel, OFFICE/OUTPT VISIT,ESTLEVL III                     Today's PHQ-2 Score:   PHQ-2 ( 1999 [employer]) 8/28/2019   Q1: Little interest or pleasure in doing things 1   Q2: Feeling down, depressed or hopeless 0   PHQ-2 Score 1   Q1: Little interest or pleasure in doing things Several days   Q2: Feeling down, depressed or hopeless Not at all   PHQ-2 Score 1       Abuse: Current or Past(Physical, Sexual or Emotional)- No  Do you feel safe in your environment? Yes    Social History     Tobacco Use     Smoking status: Never Smoker     Smokeless tobacco: Never Used   Substance Use Topics     Alcohol use: Yes     Alcohol/week: 0.0 oz     Comment: occa     If you drink alcohol do you typically have >3 drinks per day or >7 drinks per week? No    Alcohol Use 8/28/2019   Prescreen: >3 drinks/day or >7 drinks/week? No   Prescreen: >3 drinks/day or  ">7 drinks/week? -   No flowsheet data found.    Last PSA: No results found for: PSA    Reviewed orders with patient. Reviewed health maintenance and updated orders accordingly - Yes  Lab work is in process    Reviewed and updated as needed this visit by clinical staff  Tobacco  Allergies  Meds  Med Hx  Surg Hx  Fam Hx  Soc Hx        Reviewed and updated as needed this visit by Provider  Meds        Past Medical History:   Diagnosis Date     Trans-sexualism, status post gender reassignment surgery 3/6/2012      Past Surgical History:   Procedure Laterality Date     C INSERT TESTICULAR PROSTHESIS  2015     HYSTERECTOMY  2009     IMPLANT PROSTHESIS PENIS RIGID  2015    [facility]     LAPAROSCOPIC OOPHORECTOMY  2009     MASTECTOMY  2006       Review of Systems  CONSTITUTIONAL: NEGATIVE for fever, chills, change in weight  INTEGUMENTARY/SKIN: NEGATIVE for worrisome rashes, moles or lesions  EYES: NEGATIVE for vision changes or irritation  ENT: NEGATIVE for ear, mouth and throat problems  RESP: NEGATIVE for significant cough or SOB  CV: NEGATIVE for chest pain, palpitations or peripheral edema  GI: NEGATIVE for nausea, abdominal pain, heartburn, or change in bowel habits   male: negative for dysuria, hematuria, decreased urinary stream, erectile dysfunction, urethral discharge  MUSCULOSKELETAL: NEGATIVE for significant arthralgias or myalgia  NEURO: NEGATIVE for weakness, dizziness or paresthesias  PSYCHIATRIC: NEGATIVE for changes in mood or affect    OBJECTIVE:   /76   Pulse 78   Temp 98.5  F (36.9  C) (Oral)   Resp 16   Ht 1.676 m (5' 6\")   Wt 65 kg (143 lb 6.4 oz)   SpO2 100%   BMI 23.15 kg/m      Physical Exam  GENERAL: healthy, alert and no distress  EYES: Eyes grossly normal to inspection, PERRL and conjunctivae and sclerae normal  HENT: ear canals and TM's normal, nose and mouth without ulcers or lesions  NECK: no adenopathy, no asymmetry, masses, or scars and " thyroid normal to palpation  RESP: lungs clear to auscultation - no rales, rhonchi or wheezes  CV: regular rate and rhythm, normal S1 S2, no S3 or S4, no murmur, click or rub, no peripheral edema and peripheral pulses strong  ABDOMEN: soft, nontender, no hepatosplenomegaly, no masses and bowel sounds normal  MS: no gross musculoskeletal defects noted, no edema  SKIN: no suspicious lesions or rashes  NEURO: Normal strength and tone, mentation intact and speech normal  PSYCH: mentation appears normal, affect normal/bright    Diagnostic Test Results:  Labs reviewed in Epic  Results for orders placed or performed in visit on 08/28/19 (from the past 24 hour(s))   CBC with platelets   Result Value Ref Range    WBC 5.8 4.0 - 11.0 10e9/L    RBC Count 4.88 4.4 - 5.9 10e12/L    Hemoglobin 15.1 13.3 - 17.7 g/dL    Hematocrit 44.7 40.0 - 53.0 %    MCV 92 78 - 100 fl    MCH 30.9 26.5 - 33.0 pg    MCHC 33.8 31.5 - 36.5 g/dL    RDW 12.3 10.0 - 15.0 %    Platelet Count 284 150 - 450 10e9/L       ASSESSMENT/PLAN:   1. Routine general medical examination at a health care facility  See avs  - CBC with platelets  - Testosterone, total  - Lipid panel reflex to direct LDL Fasting    2. Trans-sexualism, status post gender reassignment surgery  Meds refilled and labs pending  - CBC with platelets  - Testosterone, total  - Lipid panel reflex to direct LDL Fasting  - testosterone (ANDROGEL/TESTIM) 50 MG/5GM (1%) topical gel; Place 2 packets (100 mg of testosterone) onto the skin daily Apply to the clean, dry intact skin of the shoulders, upper arms or abdomen.  Dispense: 3 Box; Refill: 5  - OFFICE/OUTPT VISIT,WES BEAL III    In addition to the preventive visit, 15 minutes was spent face to face with (>50%) counseling and/or coordination of care regarding addition concerns and symptoms.     COUNSELING:   Reviewed preventive health counseling, as reflected in patient instructions       Regular exercise       Healthy diet/nutrition    Estimated  "body mass index is 23.15 kg/m  as calculated from the following:    Height as of this encounter: 1.676 m (5' 6\").    Weight as of this encounter: 65 kg (143 lb 6.4 oz).          reports that he has never smoked. He has never used smokeless tobacco.      Counseling Resources:  ATP IV Guidelines  Pooled Cohorts Equation Calculator  FRAX Risk Assessment  ICSI Preventive Guidelines  Dietary Guidelines for Americans, 2010  USDA's MyPlate  ASA Prophylaxis  Lung CA Screening    Apurva Ruano MD  [facility]  "

## 2019-08-28 NOTE — NURSING NOTE
"Chief Complaint   Patient presents with     Physical     /76   Pulse 78   Temp 98.5  F (36.9  C) (Oral)   Resp 16   Ht 1.676 m (5' 6\")   Wt 65 kg (143 lb 6.4 oz)   SpO2 100%   BMI 23.15 kg/m   Estimated body mass index is 23.15 kg/m  as calculated from the following:    Height as of this encounter: 1.676 m (5' 6\").    Weight as of this encounter: 65 kg (143 lb 6.4 oz).  Medication Reconciliation: complete        Health Maintenance Due Pending Provider Review:  PHQ9    Completing today.    Renetta Marroquin MA  [facility]  [phone]  "

## 2019-08-29 LAB
CHOLEST SERPL-MCNC: 183 MG/DL
HDLC SERPL-MCNC: 59 MG/DL
LDLC SERPL CALC-MCNC: 99 MG/DL
NONHDLC SERPL-MCNC: 124 MG/DL
TRIGL SERPL-MCNC: 123 MG/DL

## 2019-08-29 ASSESSMENT — ANXIETY QUESTIONNAIRES: GAD7 TOTAL SCORE: 5

## 2019-08-30 LAB — TESTOST SERPL-MCNC: 1189 NG/DL (ref 240–950)

## 2019-09-04 NOTE — RESULT ENCOUNTER NOTE
Please call patient with results - his Test level was a bit on the high side - could you find out when his last dose of hormone was prior to the lab?  We might need to recheck this  Thank you!    Apurva

## 2019-09-05 ENCOUNTER — TELEPHONE (OUTPATIENT)
Dept: FAMILY MEDICINE | Facility: CLINIC | Age: 34
End: 2019-09-05

## 2019-09-05 DIAGNOSIS — Z87.890 TRANS-SEXUALISM, STATUS POST GENDER REASSIGNMENT SURGERY: Primary | ICD-10-CM

## 2019-09-05 NOTE — TELEPHONE ENCOUNTER
Notes recorded by Apurva Ruano MD on 9/5/2019 at 8:53 AM CDT  Lets recheck this lab in afternoon    Thanks      ------    Notes recorded by Jessica Black, RN on 9/4/2019 at 3:38 PM CDT  SN,   Patient uses Testosterone packets or topical form  Uses to arms/shoulders   Patient states he last used it the AM of his appt and labs with you   Did have it applied to area where blood was drawn  Please advise when you would like it rechecked   Thanks,  Jessica SAMANIEGO RN    ------    Notes recorded by Apurva Ruano MD on 9/4/2019 at 3:21 PM CDT  Please call patient with results - his Test level was a bit on the high side - could you find out when his last dose of hormone was prior to the lab?  We might need to recheck this  Thank you!    Apurva     ------    Notes recorded by Julia Cobian RN on 8/30/2019 at 8:58 AM CDT  JS,  Please advise in SN's absence or ok to wait for her?  Julia Dunaway RN

## 2019-10-28 ENCOUNTER — TELEPHONE (OUTPATIENT)
Dept: FAMILY MEDICINE | Facility: CLINIC | Age: 34
End: 2019-10-28

## 2019-10-28 NOTE — TELEPHONE ENCOUNTER
Prior Authorization Retail Medication Request    Medication/Dose: testosterone (ANDROGEL/TESTIM) 50 MG/5GM (1%) topical gel  ICD code (if different than what is on RX):    Previously Tried and Failed:    Rationale:  Has taken since prior to 8/18/2014    Insurance Name:  [phone]  Insurance ID:  [id number]      Pharmacy Information (if different than what is on RX)  Name:  [facility]  Phone:  [phone]

## 2019-10-29 NOTE — TELEPHONE ENCOUNTER
PA Initiation    Medication: testosterone (ANDROGEL/TESTIM) 50 MG/5GM (1%) topical gel - INITIATED  Insurance Company:    Pharmacy Filling the Rx: [id number] - [place] [address] AT [facility] OF [address]  Filling Pharmacy Phone:    Filling Pharmacy Fax:    Start Date: 10/29/2019

## 2019-10-30 NOTE — TELEPHONE ENCOUNTER
PRIOR AUTHORIZATION DENIED    Medication: testosterone (ANDROGEL/TESTIM) 50 MG/5GM (1%) topical gel - DENIED    Denial Date: 10/30/2019    Denial Rational: PATIENT NEEDS TO TRY/FAIL ANDRODERM AND BRAND NAME ANDROGEL    Appeal Information: ***

## 2019-10-31 ENCOUNTER — TELEPHONE (OUTPATIENT)
Dept: FAMILY MEDICINE | Facility: CLINIC | Age: 34
End: 2019-10-31

## 2019-10-31 NOTE — TELEPHONE ENCOUNTER
PRIOR AUTHORIZATION DENIED    Medication: testosterone (ANDROGEL/TESTIM) 50 MG/5GM (1%) topical gel - DENIED    Denial Date: 10/30/2019    Denial Rational: PATIENT NEEDS TO TRY/FAILBRAND ANDROGEL (which also requires PA- submitted) and Androderm    Appeal Information: see below - or re-submit request

## 2019-10-31 NOTE — TELEPHONE ENCOUNTER
Prior Authorization Retail Medication Request    Medication/Dose: Androgel (brand) 50mg/5gm topical gel  ICD code (if different than what is on RX):    Previously Tried and Failed:    Rationale:      Insurance Name: [place] [employer] program [phone]   Insurance ID:  [id number]      Pharmacy Information (if different than what is on RX)  Name:  [facility] [address]  Phone:  [phone]

## 2019-11-04 NOTE — TELEPHONE ENCOUNTER
Central Prior Authorization Team   Phone: [phone]      PA NOT NEEDED    Medication: (brand) Androgel -PA NOT NEEDED  Insurance Company: [employer] - Phone [phone] Fax [phone]  Pharmacy Filling the Rx: [facility] #[id number] - [place], [place] - [address] AT [facility] [address]  Filling Pharmacy Phone: [phone]  Filling Pharmacy Fax:    Start Date: 11/4/2019    Started PA on CMM and a response of An active PA is already on file with expiration date of 01/31/2020. Please wait to resubmit request within 60 days of that expiration date to obtain a PA renewal.  Called pharmacy to verify which insurance they are running the medication through, they are running through [employer]. Advised there is a PA on file, they didn't have the medication in stock but were able to process the medication for a $3 copay.  Pharmacy will notify patient when medication is ready.

## 2019-11-05 ENCOUNTER — HEALTH MAINTENANCE LETTER (OUTPATIENT)
Age: 34
End: 2019-11-05

## 2020-02-17 ENCOUNTER — TELEPHONE (OUTPATIENT)
Dept: FAMILY MEDICINE | Facility: CLINIC | Age: 35
End: 2020-02-17

## 2020-02-17 NOTE — TELEPHONE ENCOUNTER
Prior Authorization Retail Medication Request    Medication/Dose: testosterone (ANDROGEL/TESTIM) 50 MG/5GM (1%) topical gel  ICD code (if different than what is on RX):    Previously Tried and Failed:    Rationale:      Insurance Name:  [phone]  Insurance ID:  [id number]      Pharmacy Information (if different than what is on RX)  Name:  [facility]  Phone:  [phone]

## 2020-02-19 NOTE — TELEPHONE ENCOUNTER
Central Prior Authorization Team   Phone: [phone]      PA Initiation    Medication: testosterone (ANDROGEL/TESTIM) 50 MG/5GM (1%) topical gel  Insurance Company: [employer] - Phone [phone] Fax [phone]  Pharmacy Filling the Rx: [id number] [place] [address] AT [facility] OF [address]  Filling Pharmacy Phone: [phone]  Filling Pharmacy Fax:    Start Date: 2/19/2020

## 2020-02-20 NOTE — TELEPHONE ENCOUNTER
Prior Authorization Approval    Authorization Effective Date: 1/20/2020  Authorization Expiration Date: 5/20/2020  Medication: testosterone (ANDROGEL/TESTIM) 50 MG/5GM (1%) topical gel  Approved Dose/Quantity:    Reference #: [id number]   Insurance Company: [employer] - Phone [phone] Fax [phone]  Expected CoPay:       CoPay Card Available:      Foundation Assistance Needed:    Which Pharmacy is filling the prescription (Not needed for infusion/clinic administered): [facility] #[id number] [place] [address] AT [facility] OF [address]  Pharmacy Notified: Yes  Patient Notified: Yes **Instructed pharmacy to notify patient when script is ready to /ship.**

## 2020-04-30 ENCOUNTER — TELEPHONE (OUTPATIENT)
Dept: FAMILY MEDICINE | Facility: CLINIC | Age: 35
End: 2020-04-30

## 2020-04-30 NOTE — TELEPHONE ENCOUNTER
Summary:    Patient is due/failing the following:   INLFUENZA    Type of outreach:  ABSTRACTED  Action needed: NONE    If need for provider review:    Please indicate OV, lab, MTM, or nurse appt if needed.  Indicate fasting or not fasting.                                                                                                                                          Camille Solo, LAMBERT on 4/30/2020 at 10:42 AM

## 2020-09-06 NOTE — PROGRESS NOTES
Hello,    Great news, your results were normal.    Apurva Ruano MD Attending Attestation (For Attendings USE Only)...

## 2020-11-22 ENCOUNTER — HEALTH MAINTENANCE LETTER (OUTPATIENT)
Age: 35
End: 2020-11-22

## 2021-09-19 ENCOUNTER — HEALTH MAINTENANCE LETTER (OUTPATIENT)
Age: 36
End: 2021-09-19

## 2022-01-09 ENCOUNTER — HEALTH MAINTENANCE LETTER (OUTPATIENT)
Age: 37
End: 2022-01-09

## 2022-11-20 ENCOUNTER — HEALTH MAINTENANCE LETTER (OUTPATIENT)
Age: 37
End: 2022-11-20

## 2023-04-15 ENCOUNTER — HEALTH MAINTENANCE LETTER (OUTPATIENT)
Age: 38
End: 2023-04-15

## 2024-06-22 ENCOUNTER — HEALTH MAINTENANCE LETTER (OUTPATIENT)
Age: 39
End: 2024-06-22

## 2024-07-29 ENCOUNTER — TRANSFERRED RECORDS (OUTPATIENT)
Dept: HEALTH INFORMATION MANAGEMENT | Facility: CLINIC | Age: 39
End: 2024-07-29

## 2024-07-29 ENCOUNTER — MEDICAL CORRESPONDENCE (OUTPATIENT)
Dept: HEALTH INFORMATION MANAGEMENT | Facility: CLINIC | Age: 39
End: 2024-07-29

## 2024-09-12 ASSESSMENT — PATIENT HEALTH QUESTIONNAIRE - PHQ9
SUM OF ALL RESPONSES TO PHQ QUESTIONS 1-9: 9
SUM OF ALL RESPONSES TO PHQ QUESTIONS 1-9: 9
10. IF YOU CHECKED OFF ANY PROBLEMS, HOW DIFFICULT HAVE THESE PROBLEMS MADE IT FOR YOU TO DO YOUR WORK, TAKE CARE OF THINGS AT HOME, OR GET ALONG WITH OTHER PEOPLE: SOMEWHAT DIFFICULT

## 2024-09-12 ASSESSMENT — ANXIETY QUESTIONNAIRES
GAD7 TOTAL SCORE: 11
GAD7 TOTAL SCORE: 11
8. IF YOU CHECKED OFF ANY PROBLEMS, HOW DIFFICULT HAVE THESE MADE IT FOR YOU TO DO YOUR WORK, TAKE CARE OF THINGS AT HOME, OR GET ALONG WITH OTHER PEOPLE?: SOMEWHAT DIFFICULT
GAD7 TOTAL SCORE: 11
7. FEELING AFRAID AS IF SOMETHING AWFUL MIGHT HAPPEN: MORE THAN HALF THE DAYS

## 2024-09-16 ENCOUNTER — OFFICE VISIT (OUTPATIENT)
Dept: FAMILY MEDICINE | Facility: CLINIC | Age: 39
End: 2024-09-16

## 2024-09-16 VITALS
SYSTOLIC BLOOD PRESSURE: 138 MMHG | DIASTOLIC BLOOD PRESSURE: 90 MMHG | OXYGEN SATURATION: 98 % | RESPIRATION RATE: 19 BRPM | WEIGHT: 157 LBS | BODY MASS INDEX: 25.23 KG/M2 | TEMPERATURE: 97 F | HEART RATE: 78 BPM | HEIGHT: 66 IN

## 2024-09-16 DIAGNOSIS — R03.0 ELEVATED BLOOD PRESSURE READING WITHOUT DIAGNOSIS OF HYPERTENSION: ICD-10-CM

## 2024-09-16 DIAGNOSIS — F41.9 ANXIETY: ICD-10-CM

## 2024-09-16 DIAGNOSIS — Z78.9 FEMALE-TO-MALE TRANSGENDER PERSON: Primary | ICD-10-CM

## 2024-09-16 PROCEDURE — 99204 OFFICE O/P NEW MOD 45 MIN: CPT | Mod: 25 | Performed by: FAMILY MEDICINE

## 2024-09-16 PROCEDURE — 90471 IMMUNIZATION ADMIN: CPT | Performed by: FAMILY MEDICINE

## 2024-09-16 PROCEDURE — 90656 IIV3 VACC NO PRSV 0.5 ML IM: CPT | Performed by: FAMILY MEDICINE

## 2024-09-16 RX ORDER — HYDROXYZINE HYDROCHLORIDE 10 MG/1
1 TABLET, FILM COATED ORAL DAILY PRN
COMMUNITY
Start: 2022-01-01 | End: 2024-09-16

## 2024-09-16 RX ORDER — HYDROXYZINE HYDROCHLORIDE 10 MG/1
10 TABLET, FILM COATED ORAL EVERY 8 HOURS PRN
Qty: 90 TABLET | Refills: 3 | Status: SHIPPED | OUTPATIENT
Start: 2024-09-16

## 2024-09-16 RX ORDER — TESTOSTERONE CYPIONATE 200 MG/ML
VIAL (ML) INTRAMUSCULAR
COMMUNITY
End: 2024-09-16

## 2024-09-16 RX ORDER — TESTOSTERONE CYPIONATE 200 MG/ML
0.4 VIAL (ML) INTRAMUSCULAR WEEKLY
Qty: 180 ML | Refills: 5 | Status: SHIPPED | OUTPATIENT
Start: 2024-09-16

## 2024-09-16 RX ORDER — VILAZODONE HYDROCHLORIDE 40 MG/1
40 TABLET ORAL DAILY
COMMUNITY

## 2024-09-16 ASSESSMENT — PAIN SCALES - GENERAL: PAINLEVEL: NO PAIN (0)

## 2024-09-16 NOTE — PROGRESS NOTES
"Current  Assessment & Plan     (Z78.9) Female-to-male transgender person  (primary encounter diagnosis)  Comment:   Plan: Testosterone Cypionate 200 MG/ML SOLN, CBC with        platelets, Comprehensive metabolic panel (BMP +        Alb, Alk Phos, ALT, AST, Total. Bili, TP),         Testosterone Free and Total, Lipid panel reflex        to direct LDL Fasting            (F41.9) Anxiety  Comment:   Plan: Adult Mental Health  Referral,         hydrOXYzine HCl (ATARAX) 10 MG tablet            (R03.0) Elevated blood pressure reading without diagnosis of hypertension  Comment:   Plan: Home Blood Pressure Monitor Order for DME -         ONLY FOR DME                  BMI  Estimated body mass index is 25.23 kg/m  as calculated from the following:    Height as of this encounter: 1.68 m (5' 6.14\").    Weight as of this encounter: 71.2 kg (157 lb).             Ritu Royal is a 39 year old, presenting for the following health issues:  Establish Care (Refferal)        9/16/2024     3:05 PM   Additional Questions   Roomed by tj maurer   Accompanied by saeid         9/16/2024     3:05 PM   Patient Reported Additional Medications   Patient reports taking the following new medications n/a     History of Present Illness       Reason for visit:  To re-establish care with Dr. Mixon. I am moving back to [place] after living in [place] for five years. Hoping to get a referral for a psychiatrist and refills for my testosterone prescription.   He is taking medications regularly.         Here to re-est care  Interested in med management for anxiety  Is on viibryd 40 mg - has been on this for a year  Seemed to help more when he first started this    Feels this is helping some  Not helping fully  Was on celexa - this did not work  Hydroxyzine 10mg tablets taking 2 per day  This does help  Wonders about higher dose  Does not cause fatigue  Is in process of getting health insurance has 3 months of all meds  Will be running " "out    Has been on buspar - this was long ago  Has been on meds sine first year college    Has getting T levels checked at [facility] in [place] - had this 3 weeks ago all was normal              Review of Systems  Constitutional, HEENT, cardiovascular, pulmonary, gi and gu systems are negative, except as otherwise noted.      Objective    BP (!) 138/90 (BP Location: Left arm, Patient Position: Sitting, Cuff Size: Adult Regular)   Pulse 78   Temp 97  F (36.1  C) (Temporal)   Resp 19   Ht 1.68 m (5' 6.14\")   Wt 71.2 kg (157 lb)   SpO2 98%   BMI 25.23 kg/m    Body mass index is 25.23 kg/m .  Physical Exam   GENERAL: alert and no distress            Signed Electronically by: Apurva Ruano MD    "

## 2024-09-16 NOTE — NURSING NOTE
Per orders of SN, injection of FLU given by Meryl Jroge RN. Prior to immunization administration, verified patients identity using patient s name and date of birth. Patient instructed to remain in clinic for 15 minutes afterwards, and to report any adverse reaction to me or clinic staff immediately.    Meryl Jorge RN on 9/16/2024 at 3:52 PM.    Please see Immunization Activity for additional information.

## 2024-10-10 ENCOUNTER — VIRTUAL VISIT (OUTPATIENT)
Dept: FAMILY MEDICINE | Facility: CLINIC | Age: 39
End: 2024-10-10
Payer: COMMERCIAL

## 2024-10-10 DIAGNOSIS — R03.0 WHITE COAT SYNDROME WITH HIGH BLOOD PRESSURE BUT WITHOUT HYPERTENSION: ICD-10-CM

## 2024-10-10 DIAGNOSIS — F43.22 ADJUSTMENT DISORDER WITH ANXIETY: Primary | ICD-10-CM

## 2024-10-10 PROCEDURE — G2211 COMPLEX E/M VISIT ADD ON: HCPCS | Mod: 95 | Performed by: FAMILY MEDICINE

## 2024-10-10 PROCEDURE — 99213 OFFICE O/P EST LOW 20 MIN: CPT | Mod: 95 | Performed by: FAMILY MEDICINE

## 2024-10-10 ASSESSMENT — ANXIETY QUESTIONNAIRES
GAD7 TOTAL SCORE: 9
2. NOT BEING ABLE TO STOP OR CONTROL WORRYING: MORE THAN HALF THE DAYS
IF YOU CHECKED OFF ANY PROBLEMS ON THIS QUESTIONNAIRE, HOW DIFFICULT HAVE THESE PROBLEMS MADE IT FOR YOU TO DO YOUR WORK, TAKE CARE OF THINGS AT HOME, OR GET ALONG WITH OTHER PEOPLE: VERY DIFFICULT
3. WORRYING TOO MUCH ABOUT DIFFERENT THINGS: MORE THAN HALF THE DAYS
5. BEING SO RESTLESS THAT IT IS HARD TO SIT STILL: NOT AT ALL
7. FEELING AFRAID AS IF SOMETHING AWFUL MIGHT HAPPEN: SEVERAL DAYS
7. FEELING AFRAID AS IF SOMETHING AWFUL MIGHT HAPPEN: SEVERAL DAYS
1. FEELING NERVOUS, ANXIOUS, OR ON EDGE: MORE THAN HALF THE DAYS
4. TROUBLE RELAXING: MORE THAN HALF THE DAYS
6. BECOMING EASILY ANNOYED OR IRRITABLE: NOT AT ALL
8. IF YOU CHECKED OFF ANY PROBLEMS, HOW DIFFICULT HAVE THESE MADE IT FOR YOU TO DO YOUR WORK, TAKE CARE OF THINGS AT HOME, OR GET ALONG WITH OTHER PEOPLE?: VERY DIFFICULT

## 2024-10-10 ASSESSMENT — PATIENT HEALTH QUESTIONNAIRE - PHQ9
10. IF YOU CHECKED OFF ANY PROBLEMS, HOW DIFFICULT HAVE THESE PROBLEMS MADE IT FOR YOU TO DO YOUR WORK, TAKE CARE OF THINGS AT HOME, OR GET ALONG WITH OTHER PEOPLE: SOMEWHAT DIFFICULT
SUM OF ALL RESPONSES TO PHQ QUESTIONS 1-9: 7
SUM OF ALL RESPONSES TO PHQ QUESTIONS 1-9: 7

## 2024-10-10 NOTE — PROGRESS NOTES
"Lele is a 39 year old who is being evaluated via a billable video visit.    How would you like to obtain your AVS? MyChart  If the video visit is dropped, the invitation should be resent by: Send to e-mail at: [email or web address]  Will anyone else be joining your video visit? No      Assessment & Plan     (F43.22) Adjustment disorder with anxiety  (primary encounter diagnosis)  Comment: feels mood is good  Plan:     (R03.0) White coat syndrome with high blood pressure but without hypertension  Comment: tends to get anxious in the office - much beter pressures at home  Reviewed BP readings that he sent over  Plan: follow bps at home occasionally    See me in few months for physical  Labs can be done prior          BMI  Estimated body mass index is 25.23 kg/m  as calculated from the following:    Height as of 9/16/24: 1.68 m (5' 6.14\").    Weight as of 9/16/24: 71.2 kg (157 lb).           Subjective   Lele is a 39 year old, presenting for the following health issues:  Follow Up and Hypertension      Video Start Time:     HPI     Was [facility] has not had CMP in a while    Hypertension Follow-up    Do you check your blood pressure regularly outside of the clinic? Yes - got home BP. Have tracking for last 17 days.   Are you following a low salt diet? No  Are your blood pressures ever more than 140 on the top number (systolic) OR more   than 90 on the bottom number (diastolic), for example 140/90? No  Reviewed BP readings that he had uploaded from the past  All reassuring  He admits to having anxiety in the office  BP Readings from Last 2 Encounters:   09/16/24 (!) 138/90   08/28/19 124/76     Hemoglobin A1C (%)   Date Value   02/19/2013 4.8     LDL Cholesterol Calculated (mg/dL)   Date Value   08/28/2019 99   06/08/2018 108 (H)             Review of Systems  Constitutional, HEENT, cardiovascular, pulmonary, gi and gu systems are negative, except as otherwise noted.      Objective     "       Vitals:  No vitals were obtained today due to virtual visit.    Physical Exam   GENERAL: alert and no distress  EYES: Eyes grossly normal to inspection.  No discharge or erythema, or obvious scleral/conjunctival abnormalities.  RESP: No audible wheeze, cough, or visible cyanosis.    SKIN: Visible skin clear. No significant rash, abnormal pigmentation or lesions.  NEURO: Cranial nerves grossly intact.  Mentation and speech appropriate for age.  PSYCH: Appropriate affect, tone, and pace of words          Video-Visit Details  Joined the call at 10/10/2024, 12:01:35 pm.  Left the call at 10/10/2024, 12:05:53 pm.  You were on the call for 4 minutes 18 seconds .  Type of service:  Video Visit   Video End Time:  Originating Location (pt. Location):     Distant Location (provider location):    Platform used for Video Visit:   Signed Electronically by: Apurva Ruano MD

## 2024-11-02 ASSESSMENT — ANXIETY QUESTIONNAIRES
6. BECOMING EASILY ANNOYED OR IRRITABLE: SEVERAL DAYS
GAD7 TOTAL SCORE: 12
1. FEELING NERVOUS, ANXIOUS, OR ON EDGE: NEARLY EVERY DAY
7. FEELING AFRAID AS IF SOMETHING AWFUL MIGHT HAPPEN: MORE THAN HALF THE DAYS
GAD7 TOTAL SCORE: 12
3. WORRYING TOO MUCH ABOUT DIFFERENT THINGS: MORE THAN HALF THE DAYS
5. BEING SO RESTLESS THAT IT IS HARD TO SIT STILL: NOT AT ALL
IF YOU CHECKED OFF ANY PROBLEMS ON THIS QUESTIONNAIRE, HOW DIFFICULT HAVE THESE PROBLEMS MADE IT FOR YOU TO DO YOUR WORK, TAKE CARE OF THINGS AT HOME, OR GET ALONG WITH OTHER PEOPLE: VERY DIFFICULT
8. IF YOU CHECKED OFF ANY PROBLEMS, HOW DIFFICULT HAVE THESE MADE IT FOR YOU TO DO YOUR WORK, TAKE CARE OF THINGS AT HOME, OR GET ALONG WITH OTHER PEOPLE?: VERY DIFFICULT
GAD7 TOTAL SCORE: 12
7. FEELING AFRAID AS IF SOMETHING AWFUL MIGHT HAPPEN: MORE THAN HALF THE DAYS
4. TROUBLE RELAXING: MORE THAN HALF THE DAYS
2. NOT BEING ABLE TO STOP OR CONTROL WORRYING: MORE THAN HALF THE DAYS

## 2024-11-04 ENCOUNTER — VIRTUAL VISIT (OUTPATIENT)
Dept: PSYCHIATRY | Facility: CLINIC | Age: 39
End: 2024-11-04
Attending: FAMILY MEDICINE
Payer: COMMERCIAL

## 2024-11-04 DIAGNOSIS — F41.9 ANXIETY: ICD-10-CM

## 2024-11-04 DIAGNOSIS — F43.23 ADJUSTMENT DISORDER WITH MIXED ANXIETY AND DEPRESSED MOOD: ICD-10-CM

## 2024-11-04 DIAGNOSIS — F41.1 GAD (GENERALIZED ANXIETY DISORDER): Primary | ICD-10-CM

## 2024-11-04 PROCEDURE — 99205 OFFICE O/P NEW HI 60 MIN: CPT | Mod: 95 | Performed by: NURSE PRACTITIONER

## 2024-11-04 PROCEDURE — G2211 COMPLEX E/M VISIT ADD ON: HCPCS | Mod: 95 | Performed by: NURSE PRACTITIONER

## 2024-11-04 RX ORDER — VILAZODONE HYDROCHLORIDE 20 MG/1
TABLET ORAL
Qty: 14 TABLET | Refills: 0 | Status: SHIPPED | OUTPATIENT
Start: 2024-11-04

## 2024-11-04 RX ORDER — FLUOXETINE 10 MG/1
10 CAPSULE ORAL EVERY MORNING
Qty: 14 CAPSULE | Refills: 0 | Status: SHIPPED | OUTPATIENT
Start: 2024-11-04 | End: 2024-11-18

## 2024-11-04 RX ORDER — VILAZODONE HYDROCHLORIDE 10 MG/1
TABLET ORAL
Qty: 14 TABLET | Refills: 0 | Status: SHIPPED | OUTPATIENT
Start: 2024-11-04

## 2024-11-04 RX ORDER — HYDROXYZINE HYDROCHLORIDE 10 MG/1
10-20 TABLET, FILM COATED ORAL EVERY 8 HOURS PRN
Qty: 90 TABLET | Refills: 3 | Status: SHIPPED | OUTPATIENT
Start: 2024-11-04

## 2024-11-04 ASSESSMENT — PATIENT HEALTH QUESTIONNAIRE - PHQ9
SUM OF ALL RESPONSES TO PHQ QUESTIONS 1-9: 13
10. IF YOU CHECKED OFF ANY PROBLEMS, HOW DIFFICULT HAVE THESE PROBLEMS MADE IT FOR YOU TO DO YOUR WORK, TAKE CARE OF THINGS AT HOME, OR GET ALONG WITH OTHER PEOPLE: VERY DIFFICULT
SUM OF ALL RESPONSES TO PHQ QUESTIONS 1-9: 13

## 2024-11-04 ASSESSMENT — PAIN SCALES - GENERAL: PAINLEVEL_OUTOF10: NO PAIN (0)

## 2024-11-04 NOTE — NURSING NOTE
Current patient location: [address] [place] [id number]    Is the patient currently in the state of [place]? YES    Visit mode:VIDEO    If the visit is dropped, the patient can be reconnected by: VIDEO VISIT: Send to e-mail at: [email or web address]    Will anyone else be joining the visit? NO  (If patient encounters technical issues they should call [phone]    Are changes needed to the allergy or medication list? No    Are refills needed on medications prescribed by this physician? Discuss with provider    Rooming Documentation:  Questionnaire(s) completed    Reason for visit: Consult    [facility]

## 2024-11-04 NOTE — PROGRESS NOTES
Virtual Visit Details      Originating Location (pt. Location): Home    Distant Location (provider location):  Off-site  Platform used for Video Visit: [facility] Intake      IDENTIFICATION   Name: Kevin Lackaff Gilligan   : 1985/39 year old      Sex:    male          Telemedicine Visit: The patient's condition can be safely assessed and treated via synchronous audio and visual telemedicine encounter.      Consent:  The patient/guardian has verbally consented to: the potential risks and benefits of telemedicine (video visit or phone) versus in person care; bill my insurance or make self-payment for services provided; and responsibility for payment of non-covered services.    As the provider I attest to compliance with applicable laws and regulations related to telemedicine.    Face to Face/patient Contact total time: 32 minutes  Pre Charting time: 0 minutes; Post charting time, communication and other activities: 30 minutes; Total time 62 minutes      CHIEF COMPLAINT     Identifying Data:  Patient is a 39 year old,  White Not  or  male  who presents for initial visit with me.    Patient attended the session alone.  History was provided by patient who was a excellent historian.    Source of Referral:  Primary Care Provider: Apurva Ruano MD   Current Psychotherapist: none     PCP Clinical Question for referral: med mgmt of anxiety    HISTORY OF PRESENT ILLNESS     History of Present Illness               Lele presents with concern for anxiety. Recalls having bothersome anxiety their whole life with symptoms of difficulty controlling worry, feelings of uneasiness, ruminating, muscle tension, poor concentration, difficulty falling asleep, fatigue, excessive guilt that he is letting others down, low mood, lack of interest, and panic attacks once a week. Symptoms wax and wane in context to major life events with most recent exacerbation occurring in the  past year after moving back to their parent's home in [place] after living in [place], [place] for the past 5 years. He shares that his current flair up of anxiety has been the most severe he has ever experienced, making it difficult to stay calm and manage his occupational duties. Additional previous triggering factors have included moving to [place],  graduating from college, and career changes.    Has sought psychiatric management of symptoms since high school, previously seen by Karli Palomino MD and Mina Swann MD in high school and college years. Is currently on Viibryd 40mg daily and hydroxyzine 10mg which he uses twice daily with reported 60% improvement in symptoms. Reports other trials of medications (see med trial list below) with limited efficacy or diminishing effect. In addition to medication he has found that exercise and counseling to be helpful for his symptoms. He is interested in restarting counseling after just moving to [place] and obtaining new health insurance.        Past diagnoses include: ALEA  Current medications include: has a current medication list which includes the following prescription(s): hydroxyzine hcl, testosterone cypionate, and vilazodone.   Medication side effects: Denies    Vitals:   There were no vitals taken for this visit.    Pulse Readings from Last 5 Encounters:   09/16/24 78   08/28/19 78   06/08/18 83   04/06/17 103   07/19/16 80     Wt Readings from Last 5 Encounters:   09/16/24 71.2 kg (157 lb)   08/28/19 65 kg (143 lb 6.4 oz)   06/08/18 65.8 kg (145 lb)   04/06/17 67.4 kg (148 lb 8 oz)   07/19/16 66.2 kg (146 lb)     BP Readings from Last 5 Encounters:   09/16/24 (!) 138/90   08/28/19 124/76   06/08/18 114/78   04/06/17 110/70   07/19/16 127/78       Labs:    Liver/kidney function Metabolic Blood counts   Recent Labs   Lab Test 06/08/18  1035 04/28/17  0848   CR 0.93 0.98   AST 19 18   ALT 31 25   ALKPHOS 89 104    Recent Labs   Lab Test 08/28/19  1236   CHOL 183   TRIG  123   LDL 99   HDL 59    Recent Labs   Lab Test 08/28/19  1236   WBC 5.8   HGB 15.1   HCT 44.7   MCV 92           No lab results found.    ECG   No results found for this or any previous visit.        11/2/2024     1:39 PM   PROMIS-10 Total Score w/o Sub Scores   PROMIS TOTAL - SUBSCORES 24        Patient-reported         11/2/2024     1:40 PM   CAGE-AID Total Score   Total Score 0    Total Score MyChart 0 (A total score of 2 or greater is considered clinically significant)       Patient-reported         9/12/2024     2:50 PM 10/10/2024    11:56 AM 11/4/2024     1:05 PM   PHQ   PHQ-9 Total Score 9 7 13    Q9: Thoughts of better off dead/self-harm past 2 weeks Not at all  Not at all  Not at all        Patient-reported         9/12/2024     2:50 PM 10/10/2024    11:57 AM 11/2/2024     1:38 PM   ALEA-7 SCORE   Total Score 11 (moderate anxiety) 9 (mild anxiety) 12 (moderate anxiety)   Total Score 11 9 12        Patient-reported       Results                [place]-PDMP was checked today: not using controlled substances      REVIEW OF SYSTEMS:   Psychiatric Review of Symptoms:  Depression:  as outlined in HPI     Anxiety:  as outlined in HPI     Panic: palpitations/accelerated heart rate, Shortness of breath, Sense of impending doom, feeling unsteady, and muscle tension- occurring ~1x a week   Sleep / changes: Has difficulty controlling worry and this makes it difficult to initiate sleep. Getting 4-6 hours a night.  Energy: Poor  Daisy: No Symptoms  OCD: No Symptoms   Psychosis:No Symptoms  Sub use -- ETOH, harriet, cannabis, other RX or non-rx drugs, caffeine: No symptoms  Eating D/O: No Symptoms  Trauma sx: No symptoms but reports h/o bullying in childhood  Disruptive/Impulse/Conduct: No symptoms  ADHD: Distractibility    Medical Review of Systems:  10 systems (general, cardiovascular, respiratory, eyes, ENT, endocrine, GI, , M/S, neurological) were reviewed. Most pertinent finding(s) is/are: None. The remaining systems  are all unremarkable      PAST PSYCHIATRIC HISTORY:   Have you been previously diagnosed with any of these mental health condition(s)?: (Patient-Rptd) an anxiety disorder;depression What mental health services have you received in the past?: (Patient-Rptd) therapy;psychiatry Currently, are you receiving any of the following mental health services?: (Patient-Rptd) none      Hospitalizations  Psych Hosp- #- 0   Most Recent- n/a    Hx of Commitment: denies  Psychosis Hx- denies    Self-Directed Violence:   Suicide Attempt- #- 0  Most Recent- n/a  Suicidal Ideation Hx- Denies  Self-injurious Behavior: Denies  History of Violence/Aggression: No     Previous psychotherapy: Individual  counseling-- helpful  Current psychotherapy: None    Outpatient/ Residential Programs [ DBT, Day Treatment, Eating Disorder Tx etc] Denies   Electroconvulsive Therapy (ECT) or Transcranial Magnetic Stimulation (TMS): No   Pharmacogenomic Genetic Testing: No        Past Psych Med Trials        Medication Max Dose (mg) Dates / Duration Helpful? DC Reason / Adverse Effects?   Citalopram 40mg 4915-1083  Diminishing effect   Sertraline  2012 No    Buspar       Viibyrd 40mg 2967-8888 Yes    Desvenlafaxine    Rash   Venlafaxine 150mg 2012 No    Lamotrigine       Propranolol       Lorazepam       Hydroxyzine 10mg BID Current Yes    Are you taking/ not taking prescription medications as they were prescribed?: (Patient-Rptd) taking    PAST MEDICAL HISTORY:     Past Medical History:   Diagnosis Date    Depressive disorder middle school    Trans-sexualism, status post gender reassignment surgery 03/06/2012      has a past medical history of Depressive disorder (middle school) and Trans-sexualism, status post gender reassignment surgery (03/06/2012).    He has no past medical history of Arthritis, Cancer (H), Cerebral infarction (H), Congestive heart failure (H), COPD (chronic obstructive pulmonary disease) (H), Diabetes (H), History of blood  transfusion, Hypertension, Thyroid disease, or Uncomplicated asthma.  Patient Active Problem List   Diagnosis    CARDIOVASCULAR SCREENING; LDL GOAL LESS THAN 160    Adjustment disorder with anxiety    Trans-sexualism, status post gender reassignment surgery    White coat syndrome with high blood pressure but without hypertension       Surgery:   Past Surgical History:   Procedure Laterality Date    COSMETIC SURGERY  2009, 2015    GENITOURINARY SURGERY  2006    HYSTERECTOMY  01/01/2009    IMPLANT PROSTHESIS PENIS RIGID  01/01/2015    [facility]    LAPAROSCOPIC OOPHORECTOMY  01/01/2009    MASTECTOMY  01/01/2006    ZZC INSERT TESTICULAR PROSTHESIS  01/01/2015     Food and Medicine Allergies:     Allergies   Allergen Reactions    Desvenlafaxine Hives, Itching and Rash       Current medications include:   Current Outpatient Medications   Medication Sig Dispense Refill    hydrOXYzine HCl (ATARAX) 10 MG tablet Take 1 tablet (10 mg) by mouth every 8 hours as needed for anxiety. 90 tablet 3    Testosterone Cypionate 200 MG/ML SOLN Inject 0.4 mLs into the muscle once a week. 180 mL 5    vilazodone (VIIBRYD) 40 MG TABS tablet Take 40 mg by mouth daily.       No current facility-administered medications for this visit.       Results  Results              NEUROLOGICAL Hx:  History of Head injury: Denies  History of seizures: Denies  Hx of fainting: Denies     REPRODUCTIVE:  OBGYN HX: No past pregnancies          FAMILY HISTORY:   Patient reported family history includes:   Family History   Problem Relation Age of Onset    Diabetes Maternal Grandmother     Alzheimer Disease Maternal Grandmother     Hypertension Mother     Breast Cancer Mother 50      Mental Illness History: Denies  Substance Abuse History: Denies  Suicide History: Denies  Medications that helped: Unknown     SOCIAL HISTORY:       Social History                [per patient report]  Financial- What are your current financial sources?:  (Patient-Rptd) employment;other, If other, please list below:: (Patient-Rptd) I am living off savings 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 Building up my business and client base here is contributing to my anxiety. Did you serve in the ?: (Patient-Rptd) did not  Living situation- What is your housing situation?: (Patient-Rptd) other, Please elaborate about your housing situation.: (Patient-Rptd) My wife and I are moving back to [place] after living in [place], [place] for the past 4.5 years. Currently staying at my parents' house.  Feels safe at home- Yes  Household / family- Name: (Patient-Rptd) Jennifer Gilligan, Age: (Patient-Rptd) 33, Relationship: (Patient-Rptd) Wife, Living in same house?: (Patient-Rptd) yes  Relationships- What is your current relationship status? : (Patient-Rptd) , What is your sexual orientation?: (Patient-Rptd) heterosexual  Children- Do you have children?: (Patient-Rptd) no  Social/spiritual support- Who are the most supportive people in your life?  : (Patient-Rptd) mother;father;spouse  Cultural- What is your cultural background? : (Patient-Rptd) , What are ethnic, cultural, or Taoism influences that may be useful to know about you (for example history of experiencing discrimination, growing up rural/urban, valuing culturally specific treatments)?  : (Patient-Rptd) nothing I can think of, What is your preferred language?  : (Patient-Rptd) English  Education- What is your highest education? : (Patient-Rptd) college graduate  Early history- Where did you grow up?: (Patient-Rptd) [place]  [place], Who took care of you as a child?: (Patient-Rptd) biological parents  Raised by- How would you describe your parent's relationships?: (Patient-Rptd) were always together  Siblings- Do you have siblings?: (Patient-Rptd) yes, How many full siblings do you have?: (Patient-Rptd) 1  Quality of family relationships- How would you describe your current family relationships?: (Patient-Rptd) fair  Legal- Have you been involved with the legal system (child custody, order for protection, DWI, etc.)?: (Patient-Rptd) have not, Do you have a ?  : (Patient-Rptd) does not    Childhood: Yes intact home. Reports bulling in childhood.  Development/childhood milestones: Met on time.  Firearms/Weapons Access: No: Patient denies      Significant Losses / Trauma / Abuse / Neglect Issues:  There are  reports of bullying in childhood .   Issues of possible neglect are not present.   Recommended that patient call 911 or go to the local ED should there be a change in any of these risk factors    SUBSTANCE USE HISTORY:     Alcohol Currently use   Approximate date of last use: 10/29/2024   Age of 1st use: 21   Cannabis Never used   Amphetamines (Meth, Adderall, ecstasy, etc.) Never used   Please indicate if you have used the following substances:    Cocaine/crack Never used   Hallucinogens (acid, mushrooms, etc) Never used   Inhalants Never used   Please indicate if you have used the following substances:    Heroin Never used   Other Opiates (Percocet, Vicodin, Codeine, etc.) Never used   Benzodiazepine (Xanax, Ativan, Clonopin, etc.) Never used   Please indicate if you have used the following substances:    Barbiturates (phenobarbital, etc) Never used   Over the counter meds (cough syrup, Dramamine, etc) Never used   Nicotine (vape, cigarettes, chew) Never used   Please indicate if you have used the following substances:    Caffeine (soda, energy drinks etc) Currently use   Approximate date of last use: 11/2/2024   Age of  1st use: I drink coffee, since age 27   Other substance not listed above: Never used   Alcohol: Social alcohol use, ~ 4 beers every few weeks  Caffeine: 1 cup in AM daily    Patient reports no problems as a result of their drinking / drug use.   Patient has not received chemical dependency treatment in the past  Detox Admits: No  DWIs: No  Recovery Programming Involvement: Not Applicable      Discussed effect of substance use on overall health.     MENTAL STATUS EXAMINATION:   Alertness: alert  and oriented  Appearance: well groomed, stated age  Behavior/Demeanor: cooperative, pleasant, and calm, with good eye contact   Speech: regular rate and rhythm and soft tone  Language: intact and no problems  Psychomotor: normal or unremarkable  Gait and Station:  not assessed  Mood: anxious  Affect: restricted and appropriate; was congruent to mood  Thought Process/Associations: unremarkable  Thought Content:  Reports none;  Denies suicidal ideation, violent ideation, and delusions  Perception:  Reports none;  Denies auditory hallucinations and visual hallucinations  Insight: good and intact  Judgment: good and intact  Cognition: does  appear grossly intact; formal cognitive testing was not done      DIAGNOSES:   DSM5  Diagnosis:  300.02 (F41.1) Generalized Anxiety Disorder  Adjustment disorder with mixed depression and anxiety    DIFFERENTIAL DIAGNOSIS: MDD, panic disorder     Medical comorbidities impacting or contributing to clinical picture:   Patient Active Problem List    Diagnosis Date Noted    White coat syndrome with high blood pressure but without hypertension 10/10/2024     Priority: Medium    Trans-sexualism, status post gender reassignment surgery 03/06/2012     Priority: Fawad     Lele Crawford is his Endocrinologist [phone] fax  Phone [phone]    Has had mastectomy and JORDY/BSO  2009  [facility] done by Dr. Hussain Connell.      CARDIOVASCULAR SCREENING; LDL GOAL  LESS THAN 160 03/02/2012     Priority: Medium    Adjustment disorder with anxiety 03/02/2012     Priority: Medium     Dr. Maddie Edwards Consultation groups [phone]  [facility] seeing [facility]       Known issue that I take into account for their medical decisions, no current exacerbations or new concerns.      IMPRESSION:   Kevin Lackaff Gilligan is a 39 year old White, male who presents for initial visit with [facility] ([facility]) for medication management. He presents by referral of PCP with a history of anxiety and history of gender reassignment surgery. Continues to expereince lonstanding anxiety symptoms despite use of viibryd 40mg and hydroxyzine 10mg BID for the past two years. Symptoms have recently been complicated by psychosocial stressors. Discussed continued need for counseling to build coping skills and addressing behaviors that increase anxiety I.e. ruminating.  Discussed medication options for treatment including augmenting viibryd or tapering off agent and trial a new medication. Reviewed options of augmenting with mirtazapine for anxiety and sleep or replacing viibryd with a different selective serotonin reuptake inhibitor such as fluoxetine. SDM was to taper off viibryd and trial fluoxetine. Discussion provided on risk for serotonin syndrome with augmentation and with cross taper/titrate process. He was made aware that fluoxetine can also initially increase anxiety due to its activating effect and to contact our office if this should happen as we could slow down the titration to make it more tolerable.  Finally, we discussed his sleep. He has sub optimal time asleep likely due to his anxiety behaviors of ruminating. Recommended that he trial melatonin or hydroxyzine 20mg at bedtime to inidiate sleep as adequate request is required for mental wellbeing.         Psychotropic Drug Interactions:  [PSYCHCLINICDDI]  ADDITIVE SEROTONERGIC: Viibryd and  fluoxetine  Hydroxyzine and fluoxetine-Delirium may occur when Selective Serotonin Reuptake Inhibitors and Agents with Clinically Relevant Anticholinergic Effects are coadministered.   Management: routine monitoring and discontinue viibryd    Medication side effects and alternatives reviewed. Health promotion activities recommended and reviewed today. All questions addressed. Education and counseling completed regarding risks and benefits of medications and psychotherapy options. Recommend therapy for additional support.     Suicide Risk Assessment:  Today Kevin Lackaff Gilligan reports no history of suicidal ideation or self harm behaviors. In addition, there are notable risk factors for self-harm, including anxiety and mood change. However, risk is mitigated by future oriented, no access to firearms or weapons, denies suicidal intent or plan, and no family history of suicide. Therefore, based on all available evidence including the factors cited above, Kevin Lackaff Gilligan does not appear to be at imminent risk for self-harm, does not meet criteria for a 72-hr hold, and therefore remains appropriate for ongoing outpatient level of care.  A thorough assessment of risk factors related to suicide and self-harm have been reviewed and are noted above. The patient convincingly denies acute suicidality on several occasions. Local community safety resources reviewed and printed for patient to use if needed. There was no deceit detected, and the patient presented in a manner that was believable.   To the Emergency Department as needed or call after hours crisis line at [phone] or [phone]. [place] Crisis Text Line: Text [place] to 803803 or  Suicide LifeLine Chat: suicidepreventionSnowBallline.org/chat      PLAN:     Patient advised of consultative model. Patient will continue to be seen for ongoing consultation and stabilization.  Does not meet criteria for involuntary treatment or hospitalization  DISCONTINUE  Viibryd  Week 1: Decrease to 30mg for one week  Week 2: Decrease to 20mg for one week  Week 3: Decrease to 10mg for one week --- STOP AFTER  START Fluoxetine on WEEK 3 of Viibryd taper. Take fluoxetine 10mg in AM for two weeks then increase to 20mg thereafter  CHANGE Hydroxyzine to 10-20mg three times daily as needed for anxiety and sleep  MAY try melatonin 1-5mg at bedtime as needed for sleep  THERAPY- referral placed   REFERRALS- None additional needed  LABS - No lab monitoring required for current regimen-- monitor blood pressure, PHQ, and ALEA  Follow up with me 2-3 weeks or sooner if needed. [facility] can [facility] [phone] to re-schedule.  Follow up with primary care provider as planned or sooner if needed for acute medical concerns.  [employer] may be used to communicate with your provider, but this is not intended to be used for emergencies.      Patient Education:  Medication side effects and alternatives reviewed. Health promotion activities recommended and reviewed today. All questions addressed. Education and counseling completed regarding risks and benefits of medications and psychotherapy options.  Consent provided by patient/guardian  Call the psychiatric nurse line with medication questions or concerns at [phone].  [employer] may be used to communicate with your provider, but this is not intended to be used for emergencies.  SEROTONIN SYNDROME:  Discussed risks of Serotonin syndrome (ie, serotonin toxicity) which is a potentially life-threatening condition associated with increased serotonergic activity in the central nervous system (CNS). It is seen with therapeutic medication use, inadvertent interactions between drugs, and intentional self-poisoning. Serotonin syndrome may involve a spectrum of clinical findings, which often include mental status changes, autonomic hyperactivity, and neuromuscular abnormalities.    SLEEP HYGIENE: establish a sleep routine, limit screen time 1  hour prior to bed, use bed for sleep only, take sleep/medications on time (including sleepy time tea, trazadone or herbal treatments such as melatonin), aroma therapy, limit caffeine/sugar, yoga, guided imagery, stretch, meditation, limit naps to 20 minutes, make a temperature change in the room, white noise, be mindful of slowing down breathing, take a warm bath/shower, frequently wash sheets, and journaling.   Medlineplus.gov is information for patients.  It is run by the [employer] and it contains information about all disorders, diseases and all medications.        Patient Status:  [facility] MD/DO/NP/PA providers offer care a specialty service for Primary Care Providers in the [facility] that seek to optimize psychotropic medications for unstable patients.  Once medications have been optimized, our providers discharge the patient back to the referring Primary Care Provider for ongoing medication management.  This type of system allows our providers to serve a high volume of patients.   Patient will continue to be seen for ongoing consultation and stabilization.    Administrative Billing:   Time spent with patient was greater than 50% of time and/or significant time was spent in counseling and coordination of care regarding above diagnoses and treatment plan. Pre charting time and post charting time/documentation/coordination are done on date of service.     Episode of Care #1  The longitudinal plan of care for the diagnosis(es)/condition(s) as documented were addressed during this visit. Due to the added complexity in care, I will continue to support Lele in the subsequent management and with ongoing continuity of care.      Level of Medical Decision Making:   - At least 1 chronic problem that is not stable  - Engaged in prescription drug management during visit (discussed any medication benefits, side effects, alternatives, etc.)  Number of unique external sources from which notes were  reviewed: 3+ - [facility]        Signed:   MARIAH Ayala CNP on 11/4/2024 at 1:35 PM  [facility]

## 2024-11-04 NOTE — PATIENT INSTRUCTIONS
"Patient Education   Collaborative Care Psychiatry Service  What to Expect  Here's what to expect from your Collaborative Care Psychiatry Service (CCPS).   About CCPS  CCPS means 2 people work together to help you get better. You'll meet with a behavioral health clinician and a psychiatric doctor. A behavioral health clinician helps people with mental health problems by talking with them. A psychiatric doctor helps people by giving them medicine.  How it works  At every visit, you'll see the behavioral health clinician (BHC) first. They'll talk with you about how you're doing and teach you how to feel better.   Then you'll see the psychiatric doctor. This doctor can help you deal with troubling thoughts and feelings by giving you medicine. They'll make sure you know the plan for your care.   CCPS usually takes 3 to 6 visits. If you need more visits, we may have you start seeing a different psychiatric doctor for ongoing care.  If you have any questions or concerns, we'll be glad to talk with you.  About visits  Be open  At your visits, please talk openly about your problems. It may feel hard, but it's the best way for us to help you.  Cancelling visits  If you can't come to your visit, please call us right away at [phone]. If you don't cancel at least 24 hours (1 full day) before your visit, that's \"late cancellation.\"  Being late to visits  Being very late is the same as not showing up. You will be a \"no show\" if:  Your appointment starts with a BHC, and you're more than 15 minutes late for a 30-minute (half hour) visit. This will also cancel your appointment with the psychiatric doctor.  Your appointment is with a psychiatric doctor only, and you're more than 15 minutes late for a 30-minute (half hour) visit.  Your appointment is with a psychiatric doctor only, and you're more than 30 minutes late for a 60-minute (full hour) visit.  If you cancel late or don't show up 2 times within 6 months, we may end your " care.   Getting help between visits  If you need help between visits, you can call us Monday to Friday from 8 a.m. to 4:30 p.m. at [phone].  Emergency care  Call 911 or go to the nearest emergency department if your life or someone else's life is in danger.  Call 988 anytime to reach the national Suicide and Crisis hotline.  Medicine refills  To refill your medicine, call your pharmacy. You can also call [facility]'s [facility] at [phone], Monday to Friday, 8 a.m. to 4:30 p.m. It can take 1 to 3 business days to get a refill.   Forms, letters, and tests  You may have papers to fill out, like FMLA, short-term disability, and workability. We can help you with these forms at your visits, but you must have an appointment. You may need more than 1 visit for this, to be in an intensive therapy program, or both.  Before we can give you medicine for ADHD, we may refer you to get tested for it or confirm it another way.  We may not be able to give you an emotional support animal letter.  We don't do mental health checks ordered by the court.   We don't do mental health testing, but we can refer you to get tested.   Thank you for choosing us for your care.  For informational purposes only. Not to replace the advice of your health care provider. Copyright   2022 [facility]. All rights reserved. [employer] [id number] - 12/22.